# Patient Record
Sex: FEMALE | Race: WHITE | Employment: OTHER | URBAN - NONMETROPOLITAN AREA
[De-identification: names, ages, dates, MRNs, and addresses within clinical notes are randomized per-mention and may not be internally consistent; named-entity substitution may affect disease eponyms.]

---

## 2017-03-29 ENCOUNTER — OFFICE VISIT (OUTPATIENT)
Dept: FAMILY MEDICINE CLINIC | Age: 82
End: 2017-03-29

## 2017-03-29 VITALS
BODY MASS INDEX: 31.54 KG/M2 | WEIGHT: 178 LBS | HEIGHT: 63 IN | SYSTOLIC BLOOD PRESSURE: 138 MMHG | DIASTOLIC BLOOD PRESSURE: 64 MMHG | HEART RATE: 70 BPM | OXYGEN SATURATION: 97 %

## 2017-03-29 DIAGNOSIS — M54.16 LUMBAR RADICULOPATHY: ICD-10-CM

## 2017-03-29 DIAGNOSIS — J44.9 CHRONIC OBSTRUCTIVE PULMONARY DISEASE, UNSPECIFIED COPD TYPE (HCC): ICD-10-CM

## 2017-03-29 DIAGNOSIS — M16.10 PRIMARY OSTEOARTHRITIS OF HIP, UNSPECIFIED LATERALITY: ICD-10-CM

## 2017-03-29 DIAGNOSIS — I10 ESSENTIAL HYPERTENSION: ICD-10-CM

## 2017-03-29 DIAGNOSIS — I25.10 CORONARY ARTERY DISEASE INVOLVING NATIVE HEART WITHOUT ANGINA PECTORIS, UNSPECIFIED VESSEL OR LESION TYPE: ICD-10-CM

## 2017-03-29 DIAGNOSIS — M19.90 ARTHRITIS: Primary | ICD-10-CM

## 2017-03-29 PROCEDURE — G8399 PT W/DXA RESULTS DOCUMENT: HCPCS | Performed by: FAMILY MEDICINE

## 2017-03-29 PROCEDURE — 3023F SPIROM DOC REV: CPT | Performed by: FAMILY MEDICINE

## 2017-03-29 PROCEDURE — 99213 OFFICE O/P EST LOW 20 MIN: CPT | Performed by: FAMILY MEDICINE

## 2017-03-29 PROCEDURE — 1036F TOBACCO NON-USER: CPT | Performed by: FAMILY MEDICINE

## 2017-03-29 PROCEDURE — G8427 DOCREV CUR MEDS BY ELIG CLIN: HCPCS | Performed by: FAMILY MEDICINE

## 2017-03-29 PROCEDURE — 4040F PNEUMOC VAC/ADMIN/RCVD: CPT | Performed by: FAMILY MEDICINE

## 2017-03-29 PROCEDURE — G8484 FLU IMMUNIZE NO ADMIN: HCPCS | Performed by: FAMILY MEDICINE

## 2017-03-29 PROCEDURE — G8598 ASA/ANTIPLAT THER USED: HCPCS | Performed by: FAMILY MEDICINE

## 2017-03-29 PROCEDURE — G8926 SPIRO NO PERF OR DOC: HCPCS | Performed by: FAMILY MEDICINE

## 2017-03-29 PROCEDURE — 1090F PRES/ABSN URINE INCON ASSESS: CPT | Performed by: FAMILY MEDICINE

## 2017-03-29 PROCEDURE — 1123F ACP DISCUSS/DSCN MKR DOCD: CPT | Performed by: FAMILY MEDICINE

## 2017-03-29 PROCEDURE — G8417 CALC BMI ABV UP PARAM F/U: HCPCS | Performed by: FAMILY MEDICINE

## 2017-03-29 RX ORDER — CELECOXIB 200 MG/1
200 CAPSULE ORAL DAILY
Qty: 30 CAPSULE | Refills: 3 | Status: SHIPPED | OUTPATIENT
Start: 2017-03-29 | End: 2017-03-29 | Stop reason: SDUPTHER

## 2017-03-29 RX ORDER — NEBIVOLOL HYDROCHLORIDE 20 MG/1
20 TABLET ORAL DAILY
Status: ON HOLD | COMMUNITY
Start: 2017-03-13 | End: 2018-03-21 | Stop reason: HOSPADM

## 2017-03-29 RX ORDER — VALSARTAN 160 MG/1
160 TABLET ORAL DAILY
Status: ON HOLD | COMMUNITY
End: 2018-03-10

## 2017-03-29 RX ORDER — HYDROCHLOROTHIAZIDE 25 MG/1
25 TABLET ORAL DAILY
COMMUNITY
Start: 2017-02-11 | End: 2018-05-15

## 2017-03-29 RX ORDER — UBIDECARENONE 200 MG
CAPSULE ORAL
COMMUNITY
End: 2018-03-07

## 2017-03-30 RX ORDER — CELECOXIB 200 MG/1
CAPSULE ORAL
Qty: 90 CAPSULE | Refills: 3 | Status: SHIPPED | OUTPATIENT
Start: 2017-03-30 | End: 2018-03-07

## 2017-06-26 DIAGNOSIS — J44.9 CHRONIC OBSTRUCTIVE PULMONARY DISEASE, UNSPECIFIED COPD TYPE (HCC): ICD-10-CM

## 2017-06-26 RX ORDER — MONTELUKAST SODIUM 10 MG/1
TABLET ORAL
Qty: 90 TABLET | Refills: 0 | Status: SHIPPED | OUTPATIENT
Start: 2017-06-26 | End: 2017-09-21 | Stop reason: SDUPTHER

## 2017-06-30 ENCOUNTER — OFFICE VISIT (OUTPATIENT)
Dept: FAMILY MEDICINE CLINIC | Age: 82
End: 2017-06-30

## 2017-06-30 VITALS
HEART RATE: 88 BPM | OXYGEN SATURATION: 97 % | TEMPERATURE: 99.2 F | DIASTOLIC BLOOD PRESSURE: 70 MMHG | SYSTOLIC BLOOD PRESSURE: 138 MMHG | HEIGHT: 63 IN

## 2017-06-30 DIAGNOSIS — M25.50 POLYARTHRALGIA: ICD-10-CM

## 2017-06-30 DIAGNOSIS — J44.9 CHRONIC OBSTRUCTIVE PULMONARY DISEASE, UNSPECIFIED COPD TYPE (HCC): ICD-10-CM

## 2017-06-30 DIAGNOSIS — R11.0 NAUSEA: Primary | ICD-10-CM

## 2017-06-30 DIAGNOSIS — R07.89 ATYPICAL CHEST PAIN: ICD-10-CM

## 2017-06-30 DIAGNOSIS — R53.83 OTHER MALAISE AND FATIGUE: ICD-10-CM

## 2017-06-30 DIAGNOSIS — R06.02 SOB (SHORTNESS OF BREATH): ICD-10-CM

## 2017-06-30 DIAGNOSIS — R53.81 OTHER MALAISE AND FATIGUE: ICD-10-CM

## 2017-06-30 DIAGNOSIS — I25.10 CORONARY ARTERY DISEASE INVOLVING NATIVE HEART WITHOUT ANGINA PECTORIS, UNSPECIFIED VESSEL OR LESION TYPE: ICD-10-CM

## 2017-06-30 PROCEDURE — 1090F PRES/ABSN URINE INCON ASSESS: CPT | Performed by: FAMILY MEDICINE

## 2017-06-30 PROCEDURE — 1036F TOBACCO NON-USER: CPT | Performed by: FAMILY MEDICINE

## 2017-06-30 PROCEDURE — 4040F PNEUMOC VAC/ADMIN/RCVD: CPT | Performed by: FAMILY MEDICINE

## 2017-06-30 PROCEDURE — 1123F ACP DISCUSS/DSCN MKR DOCD: CPT | Performed by: FAMILY MEDICINE

## 2017-06-30 PROCEDURE — 99213 OFFICE O/P EST LOW 20 MIN: CPT | Performed by: FAMILY MEDICINE

## 2017-06-30 PROCEDURE — G8598 ASA/ANTIPLAT THER USED: HCPCS | Performed by: FAMILY MEDICINE

## 2017-06-30 PROCEDURE — G8427 DOCREV CUR MEDS BY ELIG CLIN: HCPCS | Performed by: FAMILY MEDICINE

## 2017-06-30 PROCEDURE — 3023F SPIROM DOC REV: CPT | Performed by: FAMILY MEDICINE

## 2017-06-30 PROCEDURE — G8399 PT W/DXA RESULTS DOCUMENT: HCPCS | Performed by: FAMILY MEDICINE

## 2017-06-30 PROCEDURE — G8926 SPIRO NO PERF OR DOC: HCPCS | Performed by: FAMILY MEDICINE

## 2017-06-30 PROCEDURE — G8417 CALC BMI ABV UP PARAM F/U: HCPCS | Performed by: FAMILY MEDICINE

## 2017-06-30 RX ORDER — ONDANSETRON 4 MG/1
4 TABLET, ORALLY DISINTEGRATING ORAL EVERY 8 HOURS PRN
Qty: 15 TABLET | Refills: 0 | Status: SHIPPED | OUTPATIENT
Start: 2017-06-30 | End: 2018-03-07

## 2017-06-30 RX ORDER — NITROGLYCERIN 0.4 MG/1
0.4 TABLET SUBLINGUAL EVERY 5 MIN PRN
Qty: 25 TABLET | Refills: 0 | Status: SHIPPED | OUTPATIENT
Start: 2017-06-30 | End: 2018-05-15

## 2017-07-01 ENCOUNTER — HOSPITAL ENCOUNTER (OUTPATIENT)
Dept: GENERAL RADIOLOGY | Age: 82
Discharge: HOME | End: 2017-07-01
Payer: MEDICARE

## 2017-07-01 DIAGNOSIS — R06.02 SOB (SHORTNESS OF BREATH): ICD-10-CM

## 2017-07-01 DIAGNOSIS — R53.83 OTHER MALAISE AND FATIGUE: ICD-10-CM

## 2017-07-01 DIAGNOSIS — R53.81 OTHER MALAISE AND FATIGUE: ICD-10-CM

## 2017-07-01 DIAGNOSIS — M25.50 POLYARTHRALGIA: ICD-10-CM

## 2017-07-01 LAB
ALBUMIN SERPL-MCNC: 4.5 G/DL (ref 3.9–4.9)
ALP BLD-CCNC: 41 U/L (ref 40–130)
ALT SERPL-CCNC: 19 U/L (ref 0–33)
ANION GAP SERPL CALCULATED.3IONS-SCNC: 14 MEQ/L (ref 7–13)
AST SERPL-CCNC: 18 U/L (ref 0–35)
BILIRUB SERPL-MCNC: 0.4 MG/DL (ref 0–1.2)
BUN BLDV-MCNC: 18 MG/DL (ref 8–23)
C-REACTIVE PROTEIN: 3.5 MG/L (ref 0–5)
CALCIUM SERPL-MCNC: 9.5 MG/DL (ref 8.6–10.2)
CHLORIDE BLD-SCNC: 99 MEQ/L (ref 98–107)
CO2: 27 MEQ/L (ref 22–29)
CREAT SERPL-MCNC: 1.18 MG/DL (ref 0.5–0.9)
GFR AFRICAN AMERICAN: 53
GFR NON-AFRICAN AMERICAN: 43.8
GLOBULIN: 2.6 G/DL (ref 2.3–3.5)
GLUCOSE BLD-MCNC: 102 MG/DL (ref 74–109)
HCT VFR BLD CALC: 39.5 % (ref 37–47)
HEMOGLOBIN: 13 G/DL (ref 12–16)
MCH RBC QN AUTO: 31.2 PG (ref 27–31.3)
MCHC RBC AUTO-ENTMCNC: 32.8 % (ref 33–37)
MCV RBC AUTO: 94.9 FL (ref 82–100)
PDW BLD-RTO: 13.8 % (ref 11.5–14.5)
PLATELET # BLD: 281 K/UL (ref 130–400)
POTASSIUM SERPL-SCNC: 4 MEQ/L (ref 3.5–5.1)
RBC # BLD: 4.16 M/UL (ref 4.2–5.4)
SEDIMENTATION RATE, ERYTHROCYTE: 28 MM (ref 0–30)
SODIUM BLD-SCNC: 140 MEQ/L (ref 132–144)
TOTAL PROTEIN: 7.1 G/DL (ref 6.4–8.1)
TSH SERPL DL<=0.05 MIU/L-ACNC: 2.05 UIU/ML (ref 0.27–4.2)
WBC # BLD: 9.6 K/UL (ref 4.8–10.8)

## 2017-07-01 PROCEDURE — 71020 XR CHEST STANDARD TWO VW: CPT

## 2017-09-21 DIAGNOSIS — J44.9 CHRONIC OBSTRUCTIVE PULMONARY DISEASE, UNSPECIFIED COPD TYPE (HCC): ICD-10-CM

## 2017-09-21 RX ORDER — MONTELUKAST SODIUM 10 MG/1
TABLET ORAL
Qty: 90 TABLET | Refills: 0 | Status: SHIPPED | OUTPATIENT
Start: 2017-09-21 | End: 2017-12-21 | Stop reason: SDUPTHER

## 2017-10-16 ENCOUNTER — OFFICE VISIT (OUTPATIENT)
Dept: FAMILY MEDICINE CLINIC | Age: 82
End: 2017-10-16

## 2017-10-16 VITALS
OXYGEN SATURATION: 98 % | DIASTOLIC BLOOD PRESSURE: 84 MMHG | WEIGHT: 175.2 LBS | HEART RATE: 75 BPM | BODY MASS INDEX: 31.04 KG/M2 | SYSTOLIC BLOOD PRESSURE: 124 MMHG | HEIGHT: 63 IN

## 2017-10-16 DIAGNOSIS — M25.511 CHRONIC RIGHT SHOULDER PAIN: ICD-10-CM

## 2017-10-16 DIAGNOSIS — M19.90 ARTHRITIS: ICD-10-CM

## 2017-10-16 DIAGNOSIS — G89.29 CHRONIC RIGHT SHOULDER PAIN: ICD-10-CM

## 2017-10-16 DIAGNOSIS — M62.830 BACK SPASM: ICD-10-CM

## 2017-10-16 DIAGNOSIS — M25.511 CHRONIC RIGHT SHOULDER PAIN: Primary | ICD-10-CM

## 2017-10-16 DIAGNOSIS — G89.29 CHRONIC RIGHT SHOULDER PAIN: Primary | ICD-10-CM

## 2017-10-16 PROCEDURE — G8399 PT W/DXA RESULTS DOCUMENT: HCPCS | Performed by: FAMILY MEDICINE

## 2017-10-16 PROCEDURE — 4040F PNEUMOC VAC/ADMIN/RCVD: CPT | Performed by: FAMILY MEDICINE

## 2017-10-16 PROCEDURE — 99213 OFFICE O/P EST LOW 20 MIN: CPT | Performed by: FAMILY MEDICINE

## 2017-10-16 PROCEDURE — G8427 DOCREV CUR MEDS BY ELIG CLIN: HCPCS | Performed by: FAMILY MEDICINE

## 2017-10-16 PROCEDURE — 20610 DRAIN/INJ JOINT/BURSA W/O US: CPT | Performed by: FAMILY MEDICINE

## 2017-10-16 PROCEDURE — 1036F TOBACCO NON-USER: CPT | Performed by: FAMILY MEDICINE

## 2017-10-16 PROCEDURE — 1123F ACP DISCUSS/DSCN MKR DOCD: CPT | Performed by: FAMILY MEDICINE

## 2017-10-16 PROCEDURE — G8598 ASA/ANTIPLAT THER USED: HCPCS | Performed by: FAMILY MEDICINE

## 2017-10-16 PROCEDURE — G8484 FLU IMMUNIZE NO ADMIN: HCPCS | Performed by: FAMILY MEDICINE

## 2017-10-16 PROCEDURE — G8417 CALC BMI ABV UP PARAM F/U: HCPCS | Performed by: FAMILY MEDICINE

## 2017-10-16 PROCEDURE — G8510 SCR DEP NEG, NO PLAN REQD: HCPCS | Performed by: FAMILY MEDICINE

## 2017-10-16 PROCEDURE — 3288F FALL RISK ASSESSMENT DOCD: CPT | Performed by: FAMILY MEDICINE

## 2017-10-16 PROCEDURE — 1090F PRES/ABSN URINE INCON ASSESS: CPT | Performed by: FAMILY MEDICINE

## 2017-10-16 RX ORDER — DICLOFENAC SODIUM 75 MG/1
75 TABLET, DELAYED RELEASE ORAL 2 TIMES DAILY
Qty: 60 TABLET | Refills: 3 | Status: SHIPPED | OUTPATIENT
Start: 2017-10-16 | End: 2017-10-16 | Stop reason: SDUPTHER

## 2017-10-16 RX ORDER — TIZANIDINE 4 MG/1
TABLET ORAL
Qty: 270 TABLET | Refills: 2 | Status: SHIPPED | OUTPATIENT
Start: 2017-10-16 | End: 2018-03-10

## 2017-10-16 RX ORDER — TIZANIDINE 4 MG/1
4 TABLET ORAL EVERY 8 HOURS PRN
Qty: 90 TABLET | Refills: 2 | Status: SHIPPED | OUTPATIENT
Start: 2017-10-16 | End: 2017-10-16 | Stop reason: SDUPTHER

## 2017-10-16 RX ORDER — TRIAMCINOLONE ACETONIDE 40 MG/ML
40 INJECTION, SUSPENSION INTRA-ARTICULAR; INTRAMUSCULAR ONCE
Status: COMPLETED | OUTPATIENT
Start: 2017-10-16 | End: 2017-10-16

## 2017-10-16 RX ORDER — DICLOFENAC SODIUM 75 MG/1
TABLET, DELAYED RELEASE ORAL
Qty: 180 TABLET | Refills: 3 | Status: SHIPPED | OUTPATIENT
Start: 2017-10-16 | End: 2018-03-07

## 2017-10-16 RX ADMIN — TRIAMCINOLONE ACETONIDE 40 MG: 40 INJECTION, SUSPENSION INTRA-ARTICULAR; INTRAMUSCULAR at 15:04

## 2017-10-16 ASSESSMENT — PATIENT HEALTH QUESTIONNAIRE - PHQ9
SUM OF ALL RESPONSES TO PHQ QUESTIONS 1-9: 0
2. FEELING DOWN, DEPRESSED OR HOPELESS: 0
SUM OF ALL RESPONSES TO PHQ9 QUESTIONS 1 & 2: 0
1. LITTLE INTEREST OR PLEASURE IN DOING THINGS: 0

## 2017-10-16 NOTE — PROGRESS NOTES
1 tablet by mouth every 8 hours as needed (back spasm) 90 tablet 2    diclofenac (VOLTAREN) 75 MG EC tablet Take 1 tablet by mouth 2 times daily 60 tablet 3    montelukast (SINGULAIR) 10 MG tablet TAKE 1 TABLET BY MOUTH DAILY 90 tablet 0    nitroGLYCERIN (NITROSTAT) 0.4 MG SL tablet Place 1 tablet under the tongue every 5 minutes as needed for Chest pain 25 tablet 0    ondansetron (ZOFRAN-ODT) 4 MG disintegrating tablet Take 1 tablet by mouth every 8 hours as needed for Nausea or Vomiting 15 tablet 0    simvastatin (ZOCOR) 40 MG tablet TAKE 1 TABLET BY MOUTH EVERY EVENING 90 tablet 2    celecoxib (CELEBREX) 200 MG capsule TAKE 1 CAPSULE BY MOUTH DAILY 90 capsule 3    valsartan (DIOVAN) 160 MG tablet Take 160 mg by mouth daily      aspirin 81 MG tablet Take 81 mg by mouth daily      Multiple Vitamins-Minerals (CENTRUM SILVER ADULT 50+ PO) Take by mouth      Coenzyme Q10 200 MG CAPS Take by mouth      BYSTOLIC 20 MG TABS tablet       hydrochlorothiazide (HYDRODIURIL) 25 MG tablet       Handicap Placard MISC by Does not apply route Expires in 5 years 1 each 0    SYMBICORT 160-4.5 MCG/ACT AERO       OXYGEN Pt needs evaluation for portable oxygen--dx 496 1 each 0    ipratropium-albuterol (DUONEB) 0.5-2.5 (3) MG/3ML SOLN nebulizer solution Inhale 3 mLs into the lungs every 4 hours as needed for Shortness of Breath. 300 vial 3    mometasone (ELOCON) 0.1 % cream Apply topically daily. 1 Tube 1    albuterol (PROVENTIL HFA) 108 (90 BASE) MCG/ACT inhaler Inhale 2 puffs into the lungs every 6 hours as needed for Wheezing.  1 Inhaler 5     Current Facility-Administered Medications   Medication Dose Route Frequency Provider Last Rate Last Dose    triamcinolone acetonide (KENALOG-40) injection 40 mg  40 mg Intramuscular Once Jennifer Mcgowan MD        triamcinolone acetonide (KENALOG-40) injection 40 mg  40 mg Intramuscular Once Jennifer Mcgowan MD         Allergies   Allergen Reactions    Codeine        Review of chloride spray    Procedure Details     After a discussion of the risks and benefits with the patient (including the possibility that any manipulation of the joint space could introduce infection, worsening the current situation significantly), verbal consent was obtained for the procedure. The joint was prepped with alcohol,  Betadine x2, then again with alcohol. Ethyl chloride spray used for topical anesthesia. Using a lateral approach, the shoulder was injected 40mg kenalog and 2cc 2%lidocaine without epi. The injection site was cleansed with topical isopropyl alcohol and a dressing was applied. Complications:  None; patient tolerated the procedure well.      voltaren and zanaflex    ongoing f/u with Dr. Rowena De Los Santos and Dr. Preethi Nassar      Updated health maintenance.       Linda Ybarra MD

## 2017-10-30 ENCOUNTER — OFFICE VISIT (OUTPATIENT)
Dept: PULMONOLOGY | Age: 82
End: 2017-10-30

## 2017-10-30 VITALS
TEMPERATURE: 99 F | WEIGHT: 173 LBS | SYSTOLIC BLOOD PRESSURE: 124 MMHG | BODY MASS INDEX: 30.65 KG/M2 | HEIGHT: 63 IN | OXYGEN SATURATION: 96 % | DIASTOLIC BLOOD PRESSURE: 78 MMHG | HEART RATE: 68 BPM

## 2017-10-30 DIAGNOSIS — R09.02 HYPOXEMIA: ICD-10-CM

## 2017-10-30 DIAGNOSIS — Z87.891 HISTORY OF TOBACCO ABUSE: ICD-10-CM

## 2017-10-30 DIAGNOSIS — J44.9 CHRONIC OBSTRUCTIVE PULMONARY DISEASE, UNSPECIFIED COPD TYPE (HCC): Primary | ICD-10-CM

## 2017-10-30 DIAGNOSIS — R06.02 SOB (SHORTNESS OF BREATH) ON EXERTION: ICD-10-CM

## 2017-10-30 PROCEDURE — 1123F ACP DISCUSS/DSCN MKR DOCD: CPT | Performed by: PHYSICIAN ASSISTANT

## 2017-10-30 PROCEDURE — 4040F PNEUMOC VAC/ADMIN/RCVD: CPT | Performed by: PHYSICIAN ASSISTANT

## 2017-10-30 PROCEDURE — G8417 CALC BMI ABV UP PARAM F/U: HCPCS | Performed by: PHYSICIAN ASSISTANT

## 2017-10-30 PROCEDURE — 1036F TOBACCO NON-USER: CPT | Performed by: PHYSICIAN ASSISTANT

## 2017-10-30 PROCEDURE — G8427 DOCREV CUR MEDS BY ELIG CLIN: HCPCS | Performed by: PHYSICIAN ASSISTANT

## 2017-10-30 PROCEDURE — 1090F PRES/ABSN URINE INCON ASSESS: CPT | Performed by: PHYSICIAN ASSISTANT

## 2017-10-30 PROCEDURE — G8926 SPIRO NO PERF OR DOC: HCPCS | Performed by: PHYSICIAN ASSISTANT

## 2017-10-30 PROCEDURE — 3023F SPIROM DOC REV: CPT | Performed by: PHYSICIAN ASSISTANT

## 2017-10-30 PROCEDURE — G8598 ASA/ANTIPLAT THER USED: HCPCS | Performed by: PHYSICIAN ASSISTANT

## 2017-10-30 PROCEDURE — 99213 OFFICE O/P EST LOW 20 MIN: CPT | Performed by: PHYSICIAN ASSISTANT

## 2017-10-30 PROCEDURE — G8399 PT W/DXA RESULTS DOCUMENT: HCPCS | Performed by: PHYSICIAN ASSISTANT

## 2017-10-30 PROCEDURE — G8484 FLU IMMUNIZE NO ADMIN: HCPCS | Performed by: PHYSICIAN ASSISTANT

## 2017-10-30 ASSESSMENT — ENCOUNTER SYMPTOMS
COUGH: 1
SORE THROAT: 0
ABDOMINAL PAIN: 0
WHEEZING: 1
SINUS PAIN: 0
BACK PAIN: 0
SINUS PRESSURE: 0
SHORTNESS OF BREATH: 1
STRIDOR: 0
RHINORRHEA: 0

## 2017-10-30 NOTE — PROGRESS NOTES
pain.   Endocrine: Negative for polyuria. Genitourinary: Negative for dysuria. Musculoskeletal: Negative for back pain. Neurological: Negative for dizziness, facial asymmetry and headaches. Psychiatric/Behavioral: Negative for agitation and confusion. Objective    Vitals:    10/30/17 1328   BP: 124/78   Site: Right Arm   Position: Sitting   Cuff Size: Medium Adult   Pulse: 68   Temp: 99 °F (37.2 °C)   TempSrc: Tympanic   SpO2: 96%   Weight: 173 lb (78.5 kg)   Height: 5' 3\" (1.6 m)       Physical Exam   Constitutional: She is oriented to person, place, and time. She appears well-developed and well-nourished. No distress. HENT:   Head: Normocephalic and atraumatic. Right Ear: External ear normal.   Left Ear: External ear normal.   Mouth/Throat: No oropharyngeal exudate. Eyes: Pupils are equal, round, and reactive to light. Neck: Normal range of motion. Cardiovascular: Normal rate and regular rhythm. Exam reveals no gallop and no friction rub. No murmur heard. Pulmonary/Chest: Effort normal and breath sounds normal. No respiratory distress. She has no wheezes. She has no rales. She exhibits no tenderness. Diminished breath sounds bilaterally but otherwise clear to ausculation   Abdominal: Soft. There is no tenderness. Musculoskeletal: Normal range of motion. She exhibits no edema. Lymphadenopathy:     She has no cervical adenopathy. Neurological: She is alert and oriented to person, place, and time. No cranial nerve deficit. Skin: Skin is warm. She is not diaphoretic. No erythema. Assessment and Plan      ICD-10-CM ICD-9-CM    1. Chronic obstructive pulmonary disease, unspecified COPD type ([facility] J44.9 496    2. History of tobacco abuse Z87.891 V15.82    3. Hypoxemia R09.02 799.02    4. SOB (shortness of breath) on exertion R06.02 786.05      Patient reports some chronic exertional SOB that remains the same as before.  She is not interested in any additional testing at this time and is aware that if symptoms were ever unusual or atypical she she seek ER evaluation. We will continue prior treatment with symbicort 160/4.5 2 puffs BID, and duoneb QID. Patient does not need any refills at this time. She also has O2 with sleep and exercise and will continue this. Patient is to notify us of any changes in her respiratory status otherwise we will see her for routine follow up in 4 months as before. No red flags found on exam today. Discussed with patient red flag symptoms and advised ER evaluation if symptoms were to worsen. Reviewed with the patient: current clinical status, medications, activities and diet. Side effects, adverse effects of the medication prescribed today, as well as treatment plan and result expectations have been discussed with the patient who expresses understanding and desires to proceed. Close follow up to evaluate treatment results and for coordination of care. I have reviewed the patient's medical history in detail and updated the computerized patient record. Return in about 4 months (around 2/28/2018) for re-evaluation.     Mikael Lovell PA-C

## 2017-12-21 DIAGNOSIS — J44.9 CHRONIC OBSTRUCTIVE PULMONARY DISEASE, UNSPECIFIED COPD TYPE (HCC): ICD-10-CM

## 2017-12-21 RX ORDER — MONTELUKAST SODIUM 10 MG/1
TABLET ORAL
Qty: 90 TABLET | Refills: 0 | Status: SHIPPED | OUTPATIENT
Start: 2017-12-21 | End: 2018-05-15

## 2018-01-18 RX ORDER — SIMVASTATIN 40 MG
TABLET ORAL
Qty: 90 TABLET | Refills: 0 | Status: ON HOLD | OUTPATIENT
Start: 2018-01-18 | End: 2018-03-21 | Stop reason: HOSPADM

## 2018-03-07 ENCOUNTER — OFFICE VISIT (OUTPATIENT)
Dept: PULMONOLOGY | Age: 83
End: 2018-03-07
Payer: MEDICARE

## 2018-03-07 ENCOUNTER — APPOINTMENT (OUTPATIENT)
Dept: GENERAL RADIOLOGY | Age: 83
DRG: 190 | End: 2018-03-07
Payer: MEDICARE

## 2018-03-07 ENCOUNTER — HOSPITAL ENCOUNTER (INPATIENT)
Age: 83
LOS: 14 days | Discharge: NURSING, REHAB OR LONG-TERM CARE | DRG: 190 | End: 2018-03-21
Attending: INTERNAL MEDICINE | Admitting: INTERNAL MEDICINE
Payer: MEDICARE

## 2018-03-07 VITALS
TEMPERATURE: 98.9 F | DIASTOLIC BLOOD PRESSURE: 76 MMHG | HEIGHT: 63 IN | OXYGEN SATURATION: 96 % | WEIGHT: 175 LBS | SYSTOLIC BLOOD PRESSURE: 130 MMHG | BODY MASS INDEX: 31.01 KG/M2 | HEART RATE: 81 BPM

## 2018-03-07 DIAGNOSIS — J44.1 COPD EXACERBATION (HCC): Primary | ICD-10-CM

## 2018-03-07 DIAGNOSIS — Z99.81 ON HOME OXYGEN THERAPY: ICD-10-CM

## 2018-03-07 DIAGNOSIS — R06.03 RESPIRATORY DISTRESS: ICD-10-CM

## 2018-03-07 LAB
ALBUMIN SERPL-MCNC: 4.5 G/DL (ref 3.9–4.9)
ALP BLD-CCNC: 55 U/L (ref 40–130)
ALT SERPL-CCNC: 29 U/L (ref 0–33)
ANION GAP SERPL CALCULATED.3IONS-SCNC: 14 MEQ/L (ref 7–13)
AST SERPL-CCNC: 25 U/L (ref 0–35)
BASOPHILS ABSOLUTE: 0.1 K/UL (ref 0–0.2)
BASOPHILS RELATIVE PERCENT: 1.2 %
BILIRUB SERPL-MCNC: 0.3 MG/DL (ref 0–1.2)
BUN BLDV-MCNC: 29 MG/DL (ref 8–23)
CALCIUM SERPL-MCNC: 9.3 MG/DL (ref 8.6–10.2)
CHLORIDE BLD-SCNC: 94 MEQ/L (ref 98–107)
CO2: 26 MEQ/L (ref 22–29)
CREAT SERPL-MCNC: 1.5 MG/DL (ref 0.5–0.9)
EOSINOPHILS ABSOLUTE: 0.4 K/UL (ref 0–0.7)
EOSINOPHILS RELATIVE PERCENT: 5.2 %
GFR AFRICAN AMERICAN: 40.1
GFR NON-AFRICAN AMERICAN: 33.1
GLOBULIN: 2.4 G/DL (ref 2.3–3.5)
GLUCOSE BLD-MCNC: 80 MG/DL (ref 74–109)
HCT VFR BLD CALC: 38.1 % (ref 37–47)
HEMOGLOBIN: 13 G/DL (ref 12–16)
LACTIC ACID: 1.4 MMOL/L (ref 0.5–2.2)
LACTIC ACID: 2.6 MMOL/L (ref 0.5–2.2)
LYMPHOCYTES ABSOLUTE: 1.6 K/UL (ref 1–4.8)
LYMPHOCYTES RELATIVE PERCENT: 19.1 %
MCH RBC QN AUTO: 31.7 PG (ref 27–31.3)
MCHC RBC AUTO-ENTMCNC: 34.1 % (ref 33–37)
MCV RBC AUTO: 93 FL (ref 82–100)
MONOCYTES ABSOLUTE: 1.1 K/UL (ref 0.2–0.8)
MONOCYTES RELATIVE PERCENT: 13.2 %
NEUTROPHILS ABSOLUTE: 5 K/UL (ref 1.4–6.5)
NEUTROPHILS RELATIVE PERCENT: 61.3 %
PDW BLD-RTO: 13.4 % (ref 11.5–14.5)
PLATELET # BLD: 264 K/UL (ref 130–400)
POTASSIUM SERPL-SCNC: 4.5 MEQ/L (ref 3.5–5.1)
RAPID INFLUENZA  B AGN: NEGATIVE
RAPID INFLUENZA A AGN: NEGATIVE
RBC # BLD: 4.09 M/UL (ref 4.2–5.4)
SODIUM BLD-SCNC: 134 MEQ/L (ref 132–144)
TOTAL CK: 196 U/L (ref 0–170)
TOTAL PROTEIN: 6.9 G/DL (ref 6.4–8.1)
TROPONIN: <0.01 NG/ML (ref 0–0.01)
WBC # BLD: 8.1 K/UL (ref 4.8–10.8)

## 2018-03-07 PROCEDURE — 1090F PRES/ABSN URINE INCON ASSESS: CPT | Performed by: INTERNAL MEDICINE

## 2018-03-07 PROCEDURE — 87040 BLOOD CULTURE FOR BACTERIA: CPT

## 2018-03-07 PROCEDURE — 86403 PARTICLE AGGLUT ANTBDY SCRN: CPT

## 2018-03-07 PROCEDURE — 1036F TOBACCO NON-USER: CPT | Performed by: INTERNAL MEDICINE

## 2018-03-07 PROCEDURE — 94640 AIRWAY INHALATION TREATMENT: CPT

## 2018-03-07 PROCEDURE — 96375 TX/PRO/DX INJ NEW DRUG ADDON: CPT

## 2018-03-07 PROCEDURE — 3023F SPIROM DOC REV: CPT | Performed by: INTERNAL MEDICINE

## 2018-03-07 PROCEDURE — G8417 CALC BMI ABV UP PARAM F/U: HCPCS | Performed by: INTERNAL MEDICINE

## 2018-03-07 PROCEDURE — 99214 OFFICE O/P EST MOD 30 MIN: CPT | Performed by: INTERNAL MEDICINE

## 2018-03-07 PROCEDURE — 82553 CREATINE MB FRACTION: CPT

## 2018-03-07 PROCEDURE — 99285 EMERGENCY DEPT VISIT HI MDM: CPT

## 2018-03-07 PROCEDURE — 93005 ELECTROCARDIOGRAM TRACING: CPT

## 2018-03-07 PROCEDURE — 1210000000 HC MED SURG R&B

## 2018-03-07 PROCEDURE — 85025 COMPLETE CBC W/AUTO DIFF WBC: CPT

## 2018-03-07 PROCEDURE — 36415 COLL VENOUS BLD VENIPUNCTURE: CPT

## 2018-03-07 PROCEDURE — 80053 COMPREHEN METABOLIC PANEL: CPT

## 2018-03-07 PROCEDURE — G8926 SPIRO NO PERF OR DOC: HCPCS | Performed by: INTERNAL MEDICINE

## 2018-03-07 PROCEDURE — 1123F ACP DISCUSS/DSCN MKR DOCD: CPT | Performed by: INTERNAL MEDICINE

## 2018-03-07 PROCEDURE — 96365 THER/PROPH/DIAG IV INF INIT: CPT

## 2018-03-07 PROCEDURE — 94660 CPAP INITIATION&MGMT: CPT

## 2018-03-07 PROCEDURE — G8399 PT W/DXA RESULTS DOCUMENT: HCPCS | Performed by: INTERNAL MEDICINE

## 2018-03-07 PROCEDURE — 4040F PNEUMOC VAC/ADMIN/RCVD: CPT | Performed by: INTERNAL MEDICINE

## 2018-03-07 PROCEDURE — G8427 DOCREV CUR MEDS BY ELIG CLIN: HCPCS | Performed by: INTERNAL MEDICINE

## 2018-03-07 PROCEDURE — 83605 ASSAY OF LACTIC ACID: CPT

## 2018-03-07 PROCEDURE — 71045 X-RAY EXAM CHEST 1 VIEW: CPT

## 2018-03-07 PROCEDURE — G8484 FLU IMMUNIZE NO ADMIN: HCPCS | Performed by: INTERNAL MEDICINE

## 2018-03-07 PROCEDURE — G8598 ASA/ANTIPLAT THER USED: HCPCS | Performed by: INTERNAL MEDICINE

## 2018-03-07 PROCEDURE — 84484 ASSAY OF TROPONIN QUANT: CPT

## 2018-03-07 PROCEDURE — 6370000000 HC RX 637 (ALT 250 FOR IP): Performed by: NURSE PRACTITIONER

## 2018-03-07 PROCEDURE — 82550 ASSAY OF CK (CPK): CPT

## 2018-03-07 PROCEDURE — 6360000002 HC RX W HCPCS: Performed by: NURSE PRACTITIONER

## 2018-03-07 RX ORDER — MONTELUKAST SODIUM 10 MG/1
10 TABLET ORAL DAILY
Status: DISCONTINUED | OUTPATIENT
Start: 2018-03-08 | End: 2018-03-21 | Stop reason: HOSPADM

## 2018-03-07 RX ORDER — VALSARTAN 160 MG/1
160 TABLET ORAL DAILY
Status: DISCONTINUED | OUTPATIENT
Start: 2018-03-08 | End: 2018-03-21 | Stop reason: HOSPADM

## 2018-03-07 RX ORDER — LORAZEPAM 2 MG/ML
0.5 INJECTION INTRAMUSCULAR ONCE
Status: DISCONTINUED | OUTPATIENT
Start: 2018-03-07 | End: 2018-03-07 | Stop reason: HOSPADM

## 2018-03-07 RX ORDER — HYDROCHLOROTHIAZIDE 25 MG/1
25 TABLET ORAL DAILY
Status: DISCONTINUED | OUTPATIENT
Start: 2018-03-08 | End: 2018-03-17

## 2018-03-07 RX ORDER — ONDANSETRON 2 MG/ML
4 INJECTION INTRAMUSCULAR; INTRAVENOUS EVERY 6 HOURS PRN
Status: DISCONTINUED | OUTPATIENT
Start: 2018-03-07 | End: 2018-03-21 | Stop reason: HOSPADM

## 2018-03-07 RX ORDER — IPRATROPIUM BROMIDE AND ALBUTEROL SULFATE 2.5; .5 MG/3ML; MG/3ML
1 SOLUTION RESPIRATORY (INHALATION)
Status: DISCONTINUED | OUTPATIENT
Start: 2018-03-08 | End: 2018-03-08

## 2018-03-07 RX ORDER — IPRATROPIUM BROMIDE AND ALBUTEROL SULFATE 2.5; .5 MG/3ML; MG/3ML
1 SOLUTION RESPIRATORY (INHALATION) ONCE
Status: COMPLETED | OUTPATIENT
Start: 2018-03-07 | End: 2018-03-07

## 2018-03-07 RX ORDER — METHYLPREDNISOLONE SODIUM SUCCINATE 125 MG/2ML
125 INJECTION, POWDER, LYOPHILIZED, FOR SOLUTION INTRAMUSCULAR; INTRAVENOUS ONCE
Status: COMPLETED | OUTPATIENT
Start: 2018-03-07 | End: 2018-03-07

## 2018-03-07 RX ORDER — HYDRALAZINE HYDROCHLORIDE 20 MG/ML
10 INJECTION INTRAMUSCULAR; INTRAVENOUS ONCE
Status: COMPLETED | OUTPATIENT
Start: 2018-03-07 | End: 2018-03-07

## 2018-03-07 RX ORDER — ASPIRIN 81 MG/1
81 TABLET, CHEWABLE ORAL DAILY
Status: DISCONTINUED | OUTPATIENT
Start: 2018-03-08 | End: 2018-03-21 | Stop reason: HOSPADM

## 2018-03-07 RX ORDER — SODIUM CHLORIDE 0.9 % (FLUSH) 0.9 %
10 SYRINGE (ML) INJECTION PRN
Status: DISCONTINUED | OUTPATIENT
Start: 2018-03-07 | End: 2018-03-21 | Stop reason: HOSPADM

## 2018-03-07 RX ORDER — SODIUM CHLORIDE 0.9 % (FLUSH) 0.9 %
10 SYRINGE (ML) INJECTION EVERY 12 HOURS SCHEDULED
Status: DISCONTINUED | OUTPATIENT
Start: 2018-03-07 | End: 2018-03-21 | Stop reason: HOSPADM

## 2018-03-07 RX ORDER — ACETAMINOPHEN 325 MG/1
650 TABLET ORAL EVERY 4 HOURS PRN
Status: DISCONTINUED | OUTPATIENT
Start: 2018-03-07 | End: 2018-03-21 | Stop reason: HOSPADM

## 2018-03-07 RX ORDER — ALBUTEROL SULFATE 2.5 MG/3ML
2.5 SOLUTION RESPIRATORY (INHALATION)
Status: DISCONTINUED | OUTPATIENT
Start: 2018-03-08 | End: 2018-03-08

## 2018-03-07 RX ORDER — SODIUM CHLORIDE 9 MG/ML
INJECTION, SOLUTION INTRAVENOUS ONCE
Status: COMPLETED | OUTPATIENT
Start: 2018-03-07 | End: 2018-03-08

## 2018-03-07 RX ORDER — NEBIVOLOL 5 MG/1
20 TABLET ORAL DAILY
Status: DISCONTINUED | OUTPATIENT
Start: 2018-03-08 | End: 2018-03-09

## 2018-03-07 RX ORDER — METHYLPREDNISOLONE SODIUM SUCCINATE 40 MG/ML
40 INJECTION, POWDER, LYOPHILIZED, FOR SOLUTION INTRAMUSCULAR; INTRAVENOUS EVERY 8 HOURS
Status: DISCONTINUED | OUTPATIENT
Start: 2018-03-07 | End: 2018-03-21 | Stop reason: HOSPADM

## 2018-03-07 RX ORDER — MAGNESIUM SULFATE IN WATER 40 MG/ML
4 INJECTION, SOLUTION INTRAVENOUS ONCE
Status: COMPLETED | OUTPATIENT
Start: 2018-03-07 | End: 2018-03-08

## 2018-03-07 RX ORDER — SIMVASTATIN 40 MG
40 TABLET ORAL NIGHTLY
Status: DISCONTINUED | OUTPATIENT
Start: 2018-03-07 | End: 2018-03-09

## 2018-03-07 RX ADMIN — HYDRALAZINE HYDROCHLORIDE 10 MG: 20 INJECTION INTRAMUSCULAR; INTRAVENOUS at 15:49

## 2018-03-07 RX ADMIN — IPRATROPIUM BROMIDE AND ALBUTEROL SULFATE 1 AMPULE: 2.5; .5 SOLUTION RESPIRATORY (INHALATION) at 14:46

## 2018-03-07 RX ADMIN — MAGNESIUM SULFATE HEPTAHYDRATE 4 G: 40 INJECTION, SOLUTION INTRAVENOUS at 15:22

## 2018-03-07 RX ADMIN — METHYLPREDNISOLONE SODIUM SUCCINATE 125 MG: 125 INJECTION, POWDER, FOR SOLUTION INTRAMUSCULAR; INTRAVENOUS at 15:21

## 2018-03-07 ASSESSMENT — ENCOUNTER SYMPTOMS
SORE THROAT: 0
SHORTNESS OF BREATH: 1
NAUSEA: 0
CHEST TIGHTNESS: 0
EYE ITCHING: 0
WHEEZING: 1
VOMITING: 0
SHORTNESS OF BREATH: 1
DIARRHEA: 0
TROUBLE SWALLOWING: 0
CHEST TIGHTNESS: 1
ABDOMINAL PAIN: 0
COUGH: 1
EYE DISCHARGE: 0
WHEEZING: 1
VOICE CHANGE: 0
SINUS PRESSURE: 0
RHINORRHEA: 0

## 2018-03-07 ASSESSMENT — PAIN DESCRIPTION - PAIN TYPE: TYPE: ACUTE PAIN

## 2018-03-07 ASSESSMENT — PAIN DESCRIPTION - LOCATION: LOCATION: CHEST

## 2018-03-07 ASSESSMENT — PAIN SCALES - GENERAL
PAINLEVEL_OUTOF10: 6
PAINLEVEL_OUTOF10: 0

## 2018-03-07 ASSESSMENT — PAIN DESCRIPTION - DESCRIPTORS: DESCRIPTORS: PRESSURE

## 2018-03-07 ASSESSMENT — PAIN DESCRIPTION - ORIENTATION: ORIENTATION: UPPER

## 2018-03-07 NOTE — PROGRESS NOTES
Coordination normal.   Skin: Skin is warm and dry. She is not diaphoretic. Psychiatric: She has a normal mood and affect. Current Outpatient Prescriptions   Medication Sig Dispense Refill    simvastatin (ZOCOR) 40 MG tablet TAKE 1 TABLET BY MOUTH EVERY EVENING 90 tablet 0    montelukast (SINGULAIR) 10 MG tablet TAKE 1 TABLET BY MOUTH DAILY 90 tablet 0    tiZANidine (ZANAFLEX) 4 MG tablet TAKE 1 TABLET BY MOUTH EVERY 8 HOURS AS NEEDED FOR BACK SPASM 270 tablet 2    diclofenac (VOLTAREN) 75 MG EC tablet TAKE 1 TABLET BY MOUTH TWICE DAILY 180 tablet 3    nitroGLYCERIN (NITROSTAT) 0.4 MG SL tablet Place 1 tablet under the tongue every 5 minutes as needed for Chest pain 25 tablet 0    ondansetron (ZOFRAN-ODT) 4 MG disintegrating tablet Take 1 tablet by mouth every 8 hours as needed for Nausea or Vomiting 15 tablet 0    celecoxib (CELEBREX) 200 MG capsule TAKE 1 CAPSULE BY MOUTH DAILY 90 capsule 3    valsartan (DIOVAN) 160 MG tablet Take 160 mg by mouth daily      aspirin 81 MG tablet Take 81 mg by mouth daily      Multiple Vitamins-Minerals (CENTRUM SILVER ADULT 50+ PO) Take by mouth      Coenzyme Q10 200 MG CAPS Take by mouth      BYSTOLIC 20 MG TABS tablet       hydrochlorothiazide (HYDRODIURIL) 25 MG tablet       Handicap Placard MISC by Does not apply route Expires in 5 years 1 each 0    SYMBICORT 160-4.5 MCG/ACT AERO       OXYGEN Pt needs evaluation for portable oxygen--dx 496 1 each 0    ipratropium-albuterol (DUONEB) 0.5-2.5 (3) MG/3ML SOLN nebulizer solution Inhale 3 mLs into the lungs every 4 hours as needed for Shortness of Breath. 300 vial 3    mometasone (ELOCON) 0.1 % cream Apply topically daily. 1 Tube 1    albuterol (PROVENTIL HFA) 108 (90 BASE) MCG/ACT inhaler Inhale 2 puffs into the lungs every 6 hours as needed for Wheezing.  1 Inhaler 5     Current Facility-Administered Medications   Medication Dose Route Frequency Provider Last Rate Last Dose    triamcinolone acetonide

## 2018-03-07 NOTE — PROGRESS NOTES
PHARMACY MEDICATION RECONCILIATION ON ADMISSION   Reviewed medications with patient at bedside and updated patient's home medication list in [facility]   Patient stated that she was given a new medication for her arthritis from Dr. Susan Hand, however was unable to recall what it was. I attempted calling [facility] to verify the medication but they did not have anything recent in their records    Thank you,  Kimberley Boston.  Margarito Abrams, PharmD  PGY-1 Pharmacy Resident  3/7/2018 4:53 PM

## 2018-03-07 NOTE — ED TRIAGE NOTES
A &o x4 elderly female, skin pk/w/d resp labored at rest, ls dim w/ exp wheeze, + harsh cough, further exam def.

## 2018-03-07 NOTE — ED NOTES
"     [facility] History & Physical    Subjective:      Patient ID:   Negrita Castro  55 y.o.   Martínez Renteria R. Leblanc      Chief Complaint:   Breast cancer follow up    HPI:  55 y.o. female with diagnosis of Stage 1 R breast cancer 10/26/15.  "Triple negative".  Adjuvant AC x's 4, tx's 12 and Rad Rx through 16.    Port removed.  Had bilateral reconstruction surgery 17.  Further reconstruction, tummy tuck 17.  Additional fat injection at R chest done for symmetry.      DM, cholesterol, epilepsy hx, DJD, LBP.  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    LR shoulder arthroscopy, R wrist surgery, M0.    Smoke  ppd x's 35 years, quit 2015.  ETOH no.  Disability-depression, epilepsy  Allergy none    Mom ovarian cancer  Dad lung cancer  Sisters DM, lung dx.      ROS:   GEN: normal without any fever, night sweats or weight loss  HEENT: normal with no HA's, sore throat, stiff neck, changes in vision  CV: normal with no CP, SOB, PND, POOL or orthopnea  PULM: normal with no SOB, cough, hemoptysis, sputum or pleuritic pain  GI: normal with no abdominal pain, nausea, vomiting, constipation, diarrhea, melanotic stools, BRBPR, or hematemesis  : normal with no hematuria, dysuria  BREAST: See HPI.  SKIN: normal with no rash, erythema, bruising, or swelling     Past Medical History:   Diagnosis Date    Breast cancer     Cancer     RIGHT BREAST 10-15    Depression     Diabetes mellitus     Neuropathy     Panic attacks     S/P epidural steroid injection     Seizures     none since early     Wears glasses     TO DRIVE     Past Surgical History:   Procedure Laterality Date    BREAST BIOPSY      right    breast reconstruction with tumsofy lisa      BREAST SURGERY      11-3-15 LUMPECTOMY, 12-2-15 REEXCISION    mastectomy 2016      shoulder surgery and wrist      BILAT  BONE SPUR    WRIST SURGERY      RIGHT       Review of patient's allergies indicates:   Allergen Reactions    Adhesive tape-silicones " Pt reports left hand tingling . Neg neuro deficits noted. PA [place] advsied and called to bedside. PA at bedside. Pt appears anxious, denies new or worsening SOB or chest pressure. PA request this nurse to remove BiPAP and place pt on NC at 4 LPM and observe pt.       Magui Lawrence RN  03/07/18 8712 Hives     BANDAIDS    Polymycin Hives    Latex, natural rubber Itching    Polyurethane-39      Social History     Social History    Marital status: Single     Spouse name: N/A    Number of children: N/A    Years of education: N/A     Occupational History    Not on file.     Social History Main Topics    Smoking status: Former Smoker     Packs/day: 0.25     Years: 30.00     Quit date: 9/28/2015    Smokeless tobacco: Never Used    Alcohol use 0.0 oz/week      Comment: RARELY    Drug use: Unknown      Comment: medical marijuana    Sexual activity: Not on file     Other Topics Concern    Not on file     Social History Narrative    No narrative on file         Current Outpatient Prescriptions:     ASCORBIC ACID/VITAMIN E/BIOTIN (HAIR, SKIN, NAILS WITH BIOTIN ORAL), Take 1 tablet by mouth every morning. , Disp: , Rfl:     carisoprodol (SOMA) 350 MG tablet, Take 1 tablet (350 mg total) by mouth 3 (three) times daily as needed., Disp: 90 tablet, Rfl: 0    clindamycin (CLEOCIN) 300 MG capsule, Take 1 capsule (300 mg total) by mouth every 6 (six) hours., Disp: 30 capsule, Rfl: 0    diazepam (VALIUM) 10 MG tablet, Take 10 mg by mouth 4 (four) times daily. , Disp: , Rfl:     dronabinol (MARINOL) 2.5 MG capsule, Take 1 capsule (2.5 mg total) by mouth 2 (two) times daily before meals., Disp: 30 capsule, Rfl: 1    enalapril (VASOTEC) 2.5 MG tablet, Take 2.5 mg by mouth once daily. For diabetes, Disp: , Rfl: 1    furosemide (LASIX) 20 MG tablet, Take 20 mg by mouth once daily. , Disp: , Rfl:     glimepiride (AMARYL) 4 MG tablet, , Disp: , Rfl:     INSULIN GLARGINE,HUM.REC.ANLOG (BASAGLAR KWIKPEN SUBQ), Inject 10 Units into the skin., Disp: , Rfl:     metformin (GLUCOPHAGE-XR) 500 MG 24 hr tablet, , Disp: , Rfl:     multivitamin capsule, Take 1 capsule by mouth every morning., Disp: , Rfl:     oxyCODONE-acetaminophen (PERCOCET)  mg per tablet, Take 1 tablet by mouth every 4 (four) hours as needed for  "Pain., Disp: 28 tablet, Rfl: 0    pravastatin (PRAVACHOL) 40 MG tablet, Take 40 mg by mouth every morning. , Disp: , Rfl: 1    zolpidem (AMBIEN) 10 mg Tab, Take 10 mg by mouth nightly as needed., Disp: , Rfl: 0          Objective:   Vitals:  Blood pressure 139/83, pulse 108, temperature 97.9 °F (36.6 °C), resp. rate 18, height 5' 11" (1.803 m), weight 93.9 kg (207 lb), last menstrual period 01/16/2016.    Physical Examination:   GEN: no apparent distress, comfortable  HEAD: atraumatic and normocephalic  EYES: no pallor, no icterus  ENT: no pharyngeal erythema, external ears WNL; no nasal discharge; no thrush  NECK: no masses, thyroid normal, trachea midline, no LAD/LN's, supple  CV: RRR with no murmur; normal pulse  CHEST: Normal respiratory effort; CTAB; normal breath sounds; no wheeze or crackles  ABDOM:  nondistended; soft; normal bowel sounds; no rebound/guarding, abdomenal wall healing, liver spleen not palpated  MUSC/Skeletal: ROM normal; no crepitus; joints normal  EXTREM: no clubbing, cyanosis, inflammation or swelling  SKIN: She is developing keloid changes at L breast incision and navel surgical sites.  : no devi  NEURO: grossly intact; motor/sensory WNL; no tremors  PSYCH: normal mood, affect and behavior  LYMPH: normal cervical, supraclavicular, axillary and groin LN's  BREASTS: L and "R breast" healing, extensive post op change, at chest .  No palpable mass.      Labs:   Lab Results   Component Value Date    WBC 4.90 02/07/2018    HGB 13.8 02/07/2018    HCT 40.9 02/07/2018    MCV 89 02/07/2018     02/07/2018    CMP    Bone Scan negative for metastatic dx. 2/7/18.  CAT of chest negative for metastatic dx. 2/7/18.  Assessment:   (1) 55 y.o. female with diagnosis of Stage 1 triple negative R breast cancer, 10/2016.       S/P R mastectomy, SNBx, AC x's 4, T x's12 weeks, Rad Rx and recent reconstruction.    (2)Keloids developing, Dr. Hernandez injecting scar areas.    (3)Refill marinol, " norco.    RTC 4 months.

## 2018-03-07 NOTE — ED PROVIDER NOTES
however we'll hold off to see how the patient tolerates the BiPAP. She will need frequent re-evaluations. After these multiple repeated evaluations the patient has tolerated the BiPAP well and is no longer in a respiratory distress. Patient does request to have the BiPAP off however I have urged her to keep the BiPAP on until we can obtain a blood gas 1 hour after initiation to ensure respiratory status continues to improve. She verbalizes understanding and is agreeable to this. She does not feel she needs any narcotics or any anxiolytics at this time for further toleration of the BiPAP. The patient did have significant anxiety/panic attack while on the BiPAP. The patient did show enough improvement where he felt that this could come off. She was placed on nasal cannula O2. She has continued to tolerate this well. Her workup was largely unremarkable here in the emergency department. However given the patient's significant respiratory distress on arrival I do not feel comfortable sending her home. The patient is agreeable to plan that includes admission to the hospital for further evaluation and management. She will be admitted to the medical floor showing no signs or symptoms of distress or decompensation and is in an overall improved status. I do not anticipate any respiratory decline. CRITICAL CARE TIME     Critical Care: The high probability of sudden, clinically significant deterioration in the patient's condition required the highest level of my preparedness to intervene urgently. The services I provided to this patient were to treat and/or prevent clinically significant deterioration.  Services included the following: chart data review, reviewing nursing notes and/or old charts, documentation time, consultant collaboration regarding findings and treatment options, medication orders and management, direct patient care, vital sign assessments and ordering, interpreting and reviewing diagnostic studies/lab tests. Aggregate critical care time includes only time during which I was engaged in work directly related to the patient's care, as described above, whether at the bedside or elsewhere in the Emergency Department. It did not include time spent performing other reported procedures. Critical Care: 63 minutes. CONSULTS:  IP CONSULT TO HOSPITALIST  IP CONSULT TO PULMONOLOGY    PROCEDURES:  Unless otherwise noted below, none     Procedures    FINAL IMPRESSION      1. COPD exacerbation ([facility]    2.  Respiratory distress          DISPOSITION/PLAN   DISPOSITION Admitted 03/07/2018 04:46:47 PM      PATIENT REFERRED TO:  Donavon Lange MD  [address] [place] [address]  [phone]            DISCHARGE MEDICATIONS:  New Prescriptions    No medications on file          (Please note that portions of this note were completed with a voice recognition program.  Efforts were made to edit the dictations but occasionally words are mis-transcribed.)    Ami Israel CNP (electronically signed)  Attending Emergency Physician         Ami Israel, [facility]  03/07/18 5079

## 2018-03-08 LAB
ANION GAP SERPL CALCULATED.3IONS-SCNC: 19 MEQ/L (ref 7–13)
BASOPHILS ABSOLUTE: 0 K/UL (ref 0–0.2)
BASOPHILS RELATIVE PERCENT: 0.3 %
BUN BLDV-MCNC: 28 MG/DL (ref 8–23)
CALCIUM SERPL-MCNC: 8.9 MG/DL (ref 8.6–10.2)
CHLORIDE BLD-SCNC: 93 MEQ/L (ref 98–107)
CK MB: 3.4 NG/ML (ref 0–3.8)
CK MB: 6 NG/ML (ref 0–3.8)
CO2: 23 MEQ/L (ref 22–29)
CREAT SERPL-MCNC: 1.44 MG/DL (ref 0.5–0.9)
CREATINE KINASE-MB INDEX: 1.7 % (ref 0–3.5)
CREATINE KINASE-MB INDEX: 2.2 % (ref 0–3.5)
EOSINOPHILS ABSOLUTE: 0 K/UL (ref 0–0.7)
EOSINOPHILS RELATIVE PERCENT: 0 %
GFR AFRICAN AMERICAN: 42
GFR NON-AFRICAN AMERICAN: 34.7
GLUCOSE BLD-MCNC: 161 MG/DL (ref 74–109)
HCT VFR BLD CALC: 37.7 % (ref 37–47)
HEMOGLOBIN: 12.3 G/DL (ref 12–16)
LYMPHOCYTES ABSOLUTE: 1.1 K/UL (ref 1–4.8)
LYMPHOCYTES RELATIVE PERCENT: 8.8 %
MCH RBC QN AUTO: 30.8 PG (ref 27–31.3)
MCHC RBC AUTO-ENTMCNC: 32.7 % (ref 33–37)
MCV RBC AUTO: 94 FL (ref 82–100)
MONOCYTES ABSOLUTE: 0.1 K/UL (ref 0.2–0.8)
MONOCYTES RELATIVE PERCENT: 1.1 %
NEUTROPHILS ABSOLUTE: 11.2 K/UL (ref 1.4–6.5)
NEUTROPHILS RELATIVE PERCENT: 89.8 %
PDW BLD-RTO: 13.5 % (ref 11.5–14.5)
PLATELET # BLD: 249 K/UL (ref 130–400)
POTASSIUM REFLEX MAGNESIUM: 4.3 MEQ/L (ref 3.5–5.1)
RBC # BLD: 4.01 M/UL (ref 4.2–5.4)
SODIUM BLD-SCNC: 135 MEQ/L (ref 132–144)
TOTAL CK: 270 U/L (ref 0–170)
TROPONIN: <0.01 NG/ML (ref 0–0.01)
TROPONIN: <0.01 NG/ML (ref 0–0.01)
WBC # BLD: 12.5 K/UL (ref 4.8–10.8)

## 2018-03-08 PROCEDURE — 6370000000 HC RX 637 (ALT 250 FOR IP)

## 2018-03-08 PROCEDURE — 94664 DEMO&/EVAL PT USE INHALER: CPT

## 2018-03-08 PROCEDURE — 93010 ELECTROCARDIOGRAM REPORT: CPT | Performed by: INTERNAL MEDICINE

## 2018-03-08 PROCEDURE — 6360000002 HC RX W HCPCS: Performed by: INTERNAL MEDICINE

## 2018-03-08 PROCEDURE — 2580000003 HC RX 258: Performed by: NURSE PRACTITIONER

## 2018-03-08 PROCEDURE — 6360000002 HC RX W HCPCS

## 2018-03-08 PROCEDURE — 6370000000 HC RX 637 (ALT 250 FOR IP): Performed by: NURSE PRACTITIONER

## 2018-03-08 PROCEDURE — 84484 ASSAY OF TROPONIN QUANT: CPT

## 2018-03-08 PROCEDURE — 94760 N-INVAS EAR/PLS OXIMETRY 1: CPT

## 2018-03-08 PROCEDURE — 80048 BASIC METABOLIC PNL TOTAL CA: CPT

## 2018-03-08 PROCEDURE — 85025 COMPLETE CBC W/AUTO DIFF WBC: CPT

## 2018-03-08 PROCEDURE — 94640 AIRWAY INHALATION TREATMENT: CPT

## 2018-03-08 PROCEDURE — 6370000000 HC RX 637 (ALT 250 FOR IP): Performed by: INTERNAL MEDICINE

## 2018-03-08 PROCEDURE — 36415 COLL VENOUS BLD VENIPUNCTURE: CPT

## 2018-03-08 PROCEDURE — 99222 1ST HOSP IP/OBS MODERATE 55: CPT | Performed by: INTERNAL MEDICINE

## 2018-03-08 PROCEDURE — 82553 CREATINE MB FRACTION: CPT

## 2018-03-08 PROCEDURE — 82550 ASSAY OF CK (CPK): CPT

## 2018-03-08 PROCEDURE — 93005 ELECTROCARDIOGRAM TRACING: CPT

## 2018-03-08 PROCEDURE — 2500000003 HC RX 250 WO HCPCS: Performed by: INTERNAL MEDICINE

## 2018-03-08 PROCEDURE — 2060000000 HC ICU INTERMEDIATE R&B

## 2018-03-08 PROCEDURE — 2580000003 HC RX 258: Performed by: INTERNAL MEDICINE

## 2018-03-08 PROCEDURE — 2700000000 HC OXYGEN THERAPY PER DAY

## 2018-03-08 RX ORDER — MORPHINE SULFATE 4 MG/ML
INJECTION, SOLUTION INTRAMUSCULAR; INTRAVENOUS
Status: COMPLETED
Start: 2018-03-08 | End: 2018-03-08

## 2018-03-08 RX ORDER — IPRATROPIUM BROMIDE AND ALBUTEROL SULFATE 2.5; .5 MG/3ML; MG/3ML
1 SOLUTION RESPIRATORY (INHALATION) 3 TIMES DAILY
Status: DISCONTINUED | OUTPATIENT
Start: 2018-03-08 | End: 2018-03-11

## 2018-03-08 RX ORDER — NITROGLYCERIN 0.4 MG/1
TABLET SUBLINGUAL
Status: COMPLETED
Start: 2018-03-08 | End: 2018-03-08

## 2018-03-08 RX ORDER — ALBUTEROL SULFATE 2.5 MG/3ML
2.5 SOLUTION RESPIRATORY (INHALATION)
Status: DISCONTINUED | OUTPATIENT
Start: 2018-03-08 | End: 2018-03-21 | Stop reason: HOSPADM

## 2018-03-08 RX ORDER — MORPHINE SULFATE 2 MG/ML
2 INJECTION, SOLUTION INTRAMUSCULAR; INTRAVENOUS EVERY 4 HOURS PRN
Status: DISCONTINUED | OUTPATIENT
Start: 2018-03-08 | End: 2018-03-21 | Stop reason: HOSPADM

## 2018-03-08 RX ORDER — SODIUM CHLORIDE 9 MG/ML
INJECTION, SOLUTION INTRAVENOUS CONTINUOUS
Status: DISCONTINUED | OUTPATIENT
Start: 2018-03-08 | End: 2018-03-10

## 2018-03-08 RX ORDER — NITROGLYCERIN 0.4 MG/1
0.4 TABLET SUBLINGUAL EVERY 5 MIN PRN
Status: DISCONTINUED | OUTPATIENT
Start: 2018-03-08 | End: 2018-03-21 | Stop reason: HOSPADM

## 2018-03-08 RX ORDER — LORAZEPAM 2 MG/ML
0.5 INJECTION INTRAMUSCULAR ONCE
Status: COMPLETED | OUTPATIENT
Start: 2018-03-08 | End: 2018-03-08

## 2018-03-08 RX ORDER — METOPROLOL TARTRATE 5 MG/5ML
5 INJECTION INTRAVENOUS ONCE
Status: COMPLETED | OUTPATIENT
Start: 2018-03-08 | End: 2018-03-08

## 2018-03-08 RX ORDER — 0.9 % SODIUM CHLORIDE 0.9 %
500 INTRAVENOUS SOLUTION INTRAVENOUS ONCE
Status: COMPLETED | OUTPATIENT
Start: 2018-03-08 | End: 2018-03-08

## 2018-03-08 RX ADMIN — METOPROLOL TARTRATE 5 MG: 5 INJECTION, SOLUTION INTRAVENOUS at 11:57

## 2018-03-08 RX ADMIN — LORAZEPAM 0.5 MG: 2 INJECTION INTRAMUSCULAR; INTRAVENOUS at 13:47

## 2018-03-08 RX ADMIN — IPRATROPIUM BROMIDE AND ALBUTEROL SULFATE 1 AMPULE: .5; 3 SOLUTION RESPIRATORY (INHALATION) at 08:24

## 2018-03-08 RX ADMIN — METHYLPREDNISOLONE SODIUM SUCCINATE 40 MG: 40 INJECTION, POWDER, FOR SOLUTION INTRAMUSCULAR; INTRAVENOUS at 09:51

## 2018-03-08 RX ADMIN — Medication 10 ML: at 00:13

## 2018-03-08 RX ADMIN — SIMVASTATIN 40 MG: 40 TABLET, FILM COATED ORAL at 00:11

## 2018-03-08 RX ADMIN — SODIUM CHLORIDE: 9 INJECTION, SOLUTION INTRAVENOUS at 13:51

## 2018-03-08 RX ADMIN — ACETAMINOPHEN 650 MG: 325 TABLET, FILM COATED ORAL at 17:48

## 2018-03-08 RX ADMIN — NITROGLYCERIN: 0.4 TABLET SUBLINGUAL at 09:52

## 2018-03-08 RX ADMIN — ALBUTEROL SULFATE 2.5 MG: 2.5 SOLUTION RESPIRATORY (INHALATION) at 00:50

## 2018-03-08 RX ADMIN — Medication 2 MG: at 22:15

## 2018-03-08 RX ADMIN — NITROGLYCERIN 1 INCH: 20 OINTMENT TOPICAL at 17:48

## 2018-03-08 RX ADMIN — METHYLPREDNISOLONE SODIUM SUCCINATE 40 MG: 40 INJECTION, POWDER, FOR SOLUTION INTRAMUSCULAR; INTRAVENOUS at 16:00

## 2018-03-08 RX ADMIN — IPRATROPIUM BROMIDE AND ALBUTEROL SULFATE 1 AMPULE: .5; 3 SOLUTION RESPIRATORY (INHALATION) at 19:19

## 2018-03-08 RX ADMIN — ENOXAPARIN SODIUM 30 MG: 100 INJECTION SUBCUTANEOUS at 11:58

## 2018-03-08 RX ADMIN — AZITHROMYCIN MONOHYDRATE 500 MG: 500 INJECTION, POWDER, LYOPHILIZED, FOR SOLUTION INTRAVENOUS at 00:12

## 2018-03-08 RX ADMIN — NITROGLYCERIN: 0.4 TABLET SUBLINGUAL at 20:00

## 2018-03-08 RX ADMIN — SODIUM CHLORIDE 500 ML: 9 INJECTION, SOLUTION INTRAVENOUS at 11:58

## 2018-03-08 RX ADMIN — SODIUM CHLORIDE: 9 INJECTION, SOLUTION INTRAVENOUS at 00:12

## 2018-03-08 RX ADMIN — METHYLPREDNISOLONE SODIUM SUCCINATE 40 MG: 40 INJECTION, POWDER, FOR SOLUTION INTRAMUSCULAR; INTRAVENOUS at 00:12

## 2018-03-08 RX ADMIN — SIMVASTATIN 40 MG: 40 TABLET, FILM COATED ORAL at 21:37

## 2018-03-08 RX ADMIN — IPRATROPIUM BROMIDE AND ALBUTEROL SULFATE 1 AMPULE: .5; 3 SOLUTION RESPIRATORY (INHALATION) at 12:55

## 2018-03-08 RX ADMIN — Medication: at 15:51

## 2018-03-08 ASSESSMENT — PAIN SCALES - GENERAL
PAINLEVEL_OUTOF10: 8
PAINLEVEL_OUTOF10: 0
PAINLEVEL_OUTOF10: 3
PAINLEVEL_OUTOF10: 2
PAINLEVEL_OUTOF10: 0
PAINLEVEL_OUTOF10: 8

## 2018-03-08 ASSESSMENT — PAIN DESCRIPTION - ONSET: ONSET: ON-GOING

## 2018-03-08 ASSESSMENT — PAIN DESCRIPTION - DESCRIPTORS: DESCRIPTORS: DISCOMFORT

## 2018-03-08 ASSESSMENT — PAIN DESCRIPTION - PROGRESSION: CLINICAL_PROGRESSION: NOT CHANGED

## 2018-03-08 ASSESSMENT — ACTIVITIES OF DAILY LIVING (ADL): EFFECT OF PAIN ON DAILY ACTIVITIES: UNABLE TO SLEEP

## 2018-03-08 ASSESSMENT — PAIN DESCRIPTION - FREQUENCY: FREQUENCY: INTERMITTENT

## 2018-03-08 ASSESSMENT — PAIN DESCRIPTION - LOCATION: LOCATION: CHEST

## 2018-03-08 ASSESSMENT — PAIN DESCRIPTION - PAIN TYPE: TYPE: ACUTE PAIN

## 2018-03-08 NOTE — CONSULTS
Consults   Pulmonary Medicine  Consult Note      Reason for consultation: COPD exacerbation    Consulting physician: Dr. Wilkins Livers:    This is a 20-year-old female who was presented to [facility] emergency room with increased shortness of breath. Patient was seen in office for routine follow up. Patient was found having increased shortness of breath chest tightness and diffuse wheezing. She was found having respiratory distress emergency room. She has no chest pain with radiation. She has no fever or chills no nausea vomiting diarrhea or abdominal pain. Patient will was admitted with COPD exacerbation. Patient had rapid response due to chest pain. She has no nausea vomiting diarrhea or abdominal pain no fever or chills. Past Medical History:        Diagnosis Date    Arthritis     COPD (chronic obstructive pulmonary disease) ([facility]     Dr. Luther De Los Santos murmur     Hypertension     Normal cardiac stress test 2015       Past Surgical History:        Procedure Laterality Date    APPENDECTOMY      BACK SURGERY  2006    CARDIAC CATHETERIZATION  6-09    CHOLECYSTECTOMY  1976    COLONOSCOPY  2004    negative    EYE SURGERY      bilateral-catarct    HERNIA REPAIR      HYSTERECTOMY  1976    NASAL SEPTUM SURGERY      TONSILLECTOMY         Social History:     reports that she quit smoking about 25 years ago. She has a 200.00 pack-year smoking history. She has never used smokeless tobacco. She reports that she does not drink alcohol or use drugs.     Family History:       Problem Relation Age of Onset    Arthritis Mother     Diabetes Father        Allergies:  Codeine        MEDICATIONS during current hospitalization:    Continuous Infusions:   sodium chloride         Scheduled Meds:   ipratropium-albuterol  1 ampule Inhalation TID    sodium chloride  500 mL Intravenous Once    LORazepam  0.5 mg Intravenous Once    sodium chloride flush  10 mL Intravenous 2 times per day  enoxaparin  30 mg Subcutaneous Daily    methylPREDNISolone  40 mg Intravenous Q8H    azithromycin  500 mg Intravenous Q24H    aspirin  81 mg Oral Daily    nebivolol  20 mg Oral Daily    hydrochlorothiazide  25 mg Oral Daily    montelukast  10 mg Oral Daily    simvastatin  40 mg Oral Nightly    valsartan  160 mg Oral Daily       PRN Meds:albuterol, sodium chloride flush, acetaminophen, magnesium hydroxide, ondansetron    REVIEW OF SYSTEMS:  As in history of present illness  Other 14 point review of system is negative. PHYSICAL EXAM:    Vitals:  BP (!) 140/85   Pulse 108   Temp 97.5 °F (36.4 °C) (Oral)   Resp 18   Ht 5' 3\" (1.6 m)   Wt 172 lb (78 kg)   SpO2 100%   BMI 30.47 kg/m²   General:Alert, awake, Oriented x3  patient become tachypneic with any conversation. Head: Atraumatic , Normocephalic   Eyes: PERRL. No sclera icterus. No conjunctival injection. No discharge   ENT: No nasal  discharge. Pharynx clear. Neck:  Trachea midline. No thyromegaly, no JVD, No cervical adenopathy. Chest : Bilaterally symmetrical ,Normal effort,  No accessory muscle use  Lung : Diminished breath sound bilaterally No Rales. Scattered wheezing. No rhonchi. No dullness on percussion. Heart[de-identified] Normal  rate. Regular rhythm. No mumur ,  Rub or gallop  ABD: Non-tender. Non-distended. No masses. No organmegaly. Normal bowel sounds. No hernia. Musculoskeleton: normal range of motion in all extremites, strength and tone   Extremities: No pitting ,Cyanosis ,No clubbing  Neuro: no cranial nerve abnormality, normal reflex and sensation, no focal weakness   Skin: Warm and dry. No erythema rash on exposed extremities.         Data Review  Recent Labs      03/07/18   1415  03/08/18   0618   WBC  8.1  12.5*   HGB  13.0  12.3   HCT  38.1  37.7   PLT  264  249      Recent Labs      03/07/18   1415  03/08/18   0618   NA  134  135   K  4.5  4.3   CL  94*  93*   CO2  26  23   BUN  29*  28*   CREATININE  1.50*  1.44*   GLUCOSE

## 2018-03-08 NOTE — PROGRESS NOTES
Beverley Freed Pew:  \"Pt home meds in and ready to be restarted.  ALSO, pt needs a prn breathing treatment\"

## 2018-03-08 NOTE — CARE COORDINATION
Personalized Discharge Plan of Care for:  Filomena Delarosa, 1934    Based on our assessment, your plan of care includes:     Patient has all needed scripts      Patient has all required DME-NEEDS PORTABLE O2     Patient has home oxygen set up if ordered-HAS HOME O2, NEEDS PORTABLES     Patient has nebulizer and associated equipment if ordered-HAS NEBS     Patient has been assessed for medication affordability-DONE, NO NEEDS     Patient has been assessed for any transportation barriers-NONE    Respiratory therapy consult that includes bedside instructions on administration of nebulizers and/or inhalers, and assessment of oxygen and equipment needs in the home     Consult with a Pulmonologist-DONE     Consult with a Cardiologist-DONE     Consult with our Transitional Care Nurses for education and follow up-WILL FOLLOW     Palliative Care Consult-HAS BROCHURE, CALL TO [facility] TO SPEAK WITH PATIENT     Pulmonary Rehab Order for COPD, PN and CHF (if EF>35%)-REFUSED     Date of follow-up appointment with pulmonologist and/or PCP within 7 days of  Discharge     Date of follow-up appointment with cardiologist within 4 days for CHF and PCP  within 10 days of discharge        Electronically by Estella Hope RN  on 3/8/2018 at 5:29 PM

## 2018-03-08 NOTE — PROGRESS NOTES
Physician Progress Note    3/8/2018   10:52 AM    Name:  Dior Thomas  MRN:    [id number]      Day: 1     Admit Date: 3/7/2018  2:12 PM  PCP: Erick Moreno MD    Code Status:  Full Code    Subjective:      rapid response called for ongoing chest pain and dyspnea. Vitals stable. Chest pain improved with nitro but required 3 doses. BP is WNL. EKG without new changes, troponin neg yesterday, adding on to am labs another one. Physical Examination:      Vitals:  BP (!) 168/78   Pulse 92   Temp 98.1 °F (36.7 °C) (Oral)   Resp 18   Ht 5' 3\" (1.6 m)   Wt 172 lb (78 kg)   SpO2 100%   BMI 30.47 kg/m²   Temp (24hrs), Av.5 °F (36.9 °C), Min:98.1 °F (36.7 °C), Max:98.9 °F (37.2 °C)      General appearance: alert, cooperative and no distress  Mental Status: oriented to person, place and time and normal affect  Lungs: clear to auscultation bilaterally, normal effort  Heart: regular rate and rhythm, no murmur  Abdomen: soft, nontender, nondistended, bowel sounds present, no masses  Extremities: no edema, redness, tenderness in the calves  Skin: no gross lesions, rashes  Psych: appear anxious     Data:     Labs:  Recent Labs      18   1415  18   0618   WBC  8.1  12.5*   HGB  13.0  12.3   PLT  264  249     Recent Labs      18   1415  18   0618   NA  134  135   K  4.5  4.3   CL  94*  93*   CO2  26  23   BUN  29*  28*   CREATININE  1.50*  1.44*   GLUCOSE  80  161*     Recent Labs      18   1415   AST  25   ALT  29   BILITOT  0.3   ALKPHOS  55       Assessment and Plan:          Acute Hospital issues:    # chest pain- typical symptoms- likely related to uncontrolled heart rate is the setting of missing BB dose- EKG without new ischemic changes as compared to prior- improved with nitro to some extent, given morphine and 5 IV lopressor with resolution of pain. Discussed with cardiology, Dr Shashi Lala, will evaluate soon.  Will resume home beta blockers, transfer to  for more monitoring     #

## 2018-03-08 NOTE — CONSULTS
Inpatient consult to Cardiology  Consult performed by: Namrata Grant  Consult ordered by: Elizabeth Griffin              [facility]        Date of Consult:  3/8/2018    Patient:  Geovanny Garcia    : 1934  CSN: [id number]    Consulting Cardiologist: Mingo Henning MD     Primary Cardiologist: Trav Holland MD    Requesting Physician:  [facility],     Reason for Consult:  Tachycardia, Chest Pain      Assessment:    1. Chest Pain, typical , atypical  2. Tachycardia, now resolved with medications. 3. COPD Exacerbation, improved  4. Negative Cardiac Cath ,  5. Negative Myoview Stress   6. Normal LV Function, LVEF 60%  7. HTN  8. HLP  9. Anemia, Mild  10. Renal Insufficiency  11. DJD  12. Lumbar Spinal Stenosis , Post Laminectomy  13. Past Tobacco use  14. Overweight    Plan:    1. Cardiac Supportive Care  2. Resume home medications including Bystolic  3. Telemetry monitoring  4. Lexiscan myoview Stress in AM  5. Echocardiogram In AM  6. If above negative and patient stable and OK with Primary and Pulmonary, OK ot DC home From CV point. 7. Further Recommendations to follow  8. See Orders        HISTORY OF PRESENT ILLNESS:      Geovanny Garcia is a pleasant 80 y.o. female who presented to [facility] AT [place] emergency room with worsening shortness of breath and chest pain symptoms. Patient was found to have tachycardia and COPD exacerbation. She has the above-noted past and current history. Cardiology was asked to see regarding treatment and evaluation. .    Patient Follows with Dr Indra Fritz. Patient History and Records, EMR reviewed. Patient Interviewed and examined. She states that she's been doing well overall. But she does have worsening shortness of breath she does wear oxygen at home. She at night has had chest discomfort associated with pounding. She also has had chest discomfort in her chest with ambulation.   It times radiates to her left arm up. It's usually short-lived and relieved with inactivity. She's not use nitroglycerin. She's had no lower extremity pedal edema. Denies LH, Dizziness, TIA or CVA Symptoms. No Orthopnea, Edema or CHF symptoms. No Palpitations. No Syncope. No Fever, Chills or Cold symptoms. No GI,  or Bleeding complaints. She's currently asymptomatic other than shortness of breath. Cardiac and general ROS otherwise negative. [address] otherwise negative other than noted. Past Medical History:   Diagnosis Date    Arthritis     COPD (chronic obstructive pulmonary disease) ([facility] [address]     Dr. Mirtha Borden murmur     Hypertension     Normal cardiac stress test 2015       Past Surgical History:   Procedure Laterality Date    APPENDECTOMY      BACK SURGERY  2006    CARDIAC CATHETERIZATION  6-09    CHOLECYSTECTOMY  1976    COLONOSCOPY  2004    negative    EYE SURGERY      bilateral-catarct    HERNIA REPAIR      HYSTERECTOMY  1976    NASAL SEPTUM SURGERY      TONSILLECTOMY         Prior to Admission medications    Medication Sig Start Date End Date Taking?  Authorizing Provider   Misc Natural Products (GLUCOSAMINE CHOND COMPLEX/MSM PO) Take 1 tablet by mouth 2 times daily   Yes Historical Provider, MD   simvastatin (ZOCOR) 40 MG tablet TAKE 1 TABLET BY MOUTH EVERY EVENING 1/18/18  Yes Mickeal Mask, MD   montelukast (SINGULAIR) 10 MG tablet TAKE 1 TABLET BY MOUTH DAILY 12/21/17  Yes Mickeal Mask, MD   nitroGLYCERIN (NITROSTAT) 0.4 MG SL tablet Place 1 tablet under the tongue every 5 minutes as needed for Chest pain 6/30/17  Yes Mickeal Mask, MD   valsartan (DIOVAN) 160 MG tablet Take 160 mg by mouth daily   Yes Historical Provider, MD   aspirin 81 MG tablet Take 81 mg by mouth daily   Yes Historical Provider, MD   Multiple Vitamins-Minerals (CENTRUM SILVER ADULT 50+ PO) Take by mouth   Yes Historical Provider, MD   BYSTOLIC 20 MG TABS tablet 20 mg daily  3/13/17  Yes

## 2018-03-08 NOTE — PROGRESS NOTES
chronic)  []   Severe or Chronic w/ Exacerbation  [x]     Surgical Status No [x]   Surgeries     General []   Surgery Lower []   Abdominal Thoracic or []   Upper Abdominal Thoracic with  PulmonaryDisorder  []     Chest X-ray Clear/Not  Ordered     [x]  Chronic Changes  Results Pending  []  Infiltrates, atelectasis, pleural effusion, or edema  []  Infiltrates in more than one lobe []  Infiltrate + Atelectasis, &/or pleural effusion  []    Respiratory Pattern Regular,  RR = 12-20 [x]  Increased,  RR = 21-25 []  BECKER, irregular,  or RR = 26-30 []  Decreased FEV1  or RR = 31-35 []  Severe SOB, use  of accessory muscles, or RR ? 35  []    Mental Status Alert, oriented,  Cooperative [x]  Confused but Follows commands []  Lethargic or unable to follow commands []  Obtunded  []  Comatose  []    Breath Sounds Clear to  auscultation  []  Decreased unilaterally or  in bases only []  Decreased  bilaterally  []  Crackles or intermittent wheezes [x]  Wheezes []    Cough Strong, Spontan., & nonproductive [x]  Strong,  spontaneous, &  productive []  Weak,  Nonproductive []  Weak, productive or  with wheezes []  No spontaneous  cough or may require suctioning []    Level of Activity Ambulatory []  Ambulatory w/ Assist  [x]  Non-ambulatory []  Paraplegic []  Quadriplegic []    Total    Score:___8____     Triage Score:__4______      Tri       Triage:     1. (>20) Freq: Q3    2. (16-20) Freq: Q4   3. (11-15) Freq: QID & Albuterol Q2 PRN    4. (6-10) Freq: TID & Albuterol Q2 PRN    5. (0-5) Freq Q4prn

## 2018-03-09 ENCOUNTER — APPOINTMENT (OUTPATIENT)
Dept: NUCLEAR MEDICINE | Age: 83
DRG: 190 | End: 2018-03-09
Payer: MEDICARE

## 2018-03-09 ENCOUNTER — APPOINTMENT (OUTPATIENT)
Dept: NON INVASIVE DIAGNOSTICS | Age: 83
DRG: 190 | End: 2018-03-09
Payer: MEDICARE

## 2018-03-09 ENCOUNTER — APPOINTMENT (OUTPATIENT)
Dept: GENERAL RADIOLOGY | Age: 83
DRG: 190 | End: 2018-03-09
Payer: MEDICARE

## 2018-03-09 LAB
ALBUMIN SERPL-MCNC: 3.8 G/DL (ref 3.9–4.9)
ALP BLD-CCNC: 43 U/L (ref 40–130)
ALT SERPL-CCNC: 26 U/L (ref 0–33)
ANION GAP SERPL CALCULATED.3IONS-SCNC: 18 MEQ/L (ref 7–13)
AST SERPL-CCNC: 26 U/L (ref 0–35)
BILIRUB SERPL-MCNC: 0.2 MG/DL (ref 0–1.2)
BUN BLDV-MCNC: 37 MG/DL (ref 8–23)
C-REACTIVE PROTEIN, HIGH SENSITIVITY: 0.7 MG/L (ref 0–5)
CALCIUM SERPL-MCNC: 7.8 MG/DL (ref 8.6–10.2)
CHLORIDE BLD-SCNC: 98 MEQ/L (ref 98–107)
CO2: 22 MEQ/L (ref 22–29)
CREAT SERPL-MCNC: 1.46 MG/DL (ref 0.5–0.9)
GFR AFRICAN AMERICAN: 41.4
GFR NON-AFRICAN AMERICAN: 34.2
GLOBULIN: 2.1 G/DL (ref 2.3–3.5)
GLUCOSE BLD-MCNC: 103 MG/DL (ref 74–109)
HCT VFR BLD CALC: 31.8 % (ref 37–47)
HEMOGLOBIN: 10.4 G/DL (ref 12–16)
LV EF: 65 %
LVEF MODALITY: NORMAL
MAGNESIUM: 2.1 MG/DL (ref 1.7–2.3)
MCH RBC QN AUTO: 30.9 PG (ref 27–31.3)
MCHC RBC AUTO-ENTMCNC: 32.6 % (ref 33–37)
MCV RBC AUTO: 94.7 FL (ref 82–100)
PDW BLD-RTO: 13.7 % (ref 11.5–14.5)
PLATELET # BLD: 238 K/UL (ref 130–400)
POTASSIUM SERPL-SCNC: 3.8 MEQ/L (ref 3.5–5.1)
RBC # BLD: 3.36 M/UL (ref 4.2–5.4)
SEDIMENTATION RATE, ERYTHROCYTE: 13 MM (ref 0–30)
SODIUM BLD-SCNC: 138 MEQ/L (ref 132–144)
TOTAL PROTEIN: 5.9 G/DL (ref 6.4–8.1)
TROPONIN: <0.01 NG/ML (ref 0–0.01)
TROPONIN: <0.01 NG/ML (ref 0–0.01)
WBC # BLD: 21.1 K/UL (ref 4.8–10.8)

## 2018-03-09 PROCEDURE — 2060000000 HC ICU INTERMEDIATE R&B

## 2018-03-09 PROCEDURE — 93017 CV STRESS TEST TRACING ONLY: CPT

## 2018-03-09 PROCEDURE — 6360000002 HC RX W HCPCS: Performed by: INTERNAL MEDICINE

## 2018-03-09 PROCEDURE — 6370000000 HC RX 637 (ALT 250 FOR IP): Performed by: NURSE PRACTITIONER

## 2018-03-09 PROCEDURE — 36415 COLL VENOUS BLD VENIPUNCTURE: CPT

## 2018-03-09 PROCEDURE — 84484 ASSAY OF TROPONIN QUANT: CPT

## 2018-03-09 PROCEDURE — 80053 COMPREHEN METABOLIC PANEL: CPT

## 2018-03-09 PROCEDURE — 2580000003 HC RX 258: Performed by: INTERNAL MEDICINE

## 2018-03-09 PROCEDURE — 93010 ELECTROCARDIOGRAM REPORT: CPT | Performed by: INTERNAL MEDICINE

## 2018-03-09 PROCEDURE — 86141 C-REACTIVE PROTEIN HS: CPT

## 2018-03-09 PROCEDURE — 93306 TTE W/DOPPLER COMPLETE: CPT

## 2018-03-09 PROCEDURE — 85027 COMPLETE CBC AUTOMATED: CPT

## 2018-03-09 PROCEDURE — 83735 ASSAY OF MAGNESIUM: CPT

## 2018-03-09 PROCEDURE — 3430000000 HC RX DIAGNOSTIC RADIOPHARMACEUTICAL: Performed by: INTERNAL MEDICINE

## 2018-03-09 PROCEDURE — 6370000000 HC RX 637 (ALT 250 FOR IP): Performed by: INTERNAL MEDICINE

## 2018-03-09 PROCEDURE — 85652 RBC SED RATE AUTOMATED: CPT

## 2018-03-09 PROCEDURE — 99232 SBSQ HOSP IP/OBS MODERATE 35: CPT | Performed by: INTERNAL MEDICINE

## 2018-03-09 PROCEDURE — A9502 TC99M TETROFOSMIN: HCPCS | Performed by: INTERNAL MEDICINE

## 2018-03-09 PROCEDURE — 71046 X-RAY EXAM CHEST 2 VIEWS: CPT

## 2018-03-09 PROCEDURE — 94640 AIRWAY INHALATION TREATMENT: CPT

## 2018-03-09 PROCEDURE — 93005 ELECTROCARDIOGRAM TRACING: CPT

## 2018-03-09 PROCEDURE — 2700000000 HC OXYGEN THERAPY PER DAY

## 2018-03-09 PROCEDURE — 78452 HT MUSCLE IMAGE SPECT MULT: CPT

## 2018-03-09 RX ORDER — SIMVASTATIN 20 MG
20 TABLET ORAL NIGHTLY
Status: DISCONTINUED | OUTPATIENT
Start: 2018-03-09 | End: 2018-03-21 | Stop reason: HOSPADM

## 2018-03-09 RX ORDER — SODIUM CHLORIDE 0.9 % (FLUSH) 0.9 %
10 SYRINGE (ML) INJECTION PRN
Status: DISCONTINUED | OUTPATIENT
Start: 2018-03-09 | End: 2018-03-21 | Stop reason: HOSPADM

## 2018-03-09 RX ORDER — DOCUSATE SODIUM 100 MG/1
100 CAPSULE, LIQUID FILLED ORAL 2 TIMES DAILY
Status: DISCONTINUED | OUTPATIENT
Start: 2018-03-09 | End: 2018-03-21 | Stop reason: HOSPADM

## 2018-03-09 RX ADMIN — METHYLPREDNISOLONE SODIUM SUCCINATE 40 MG: 40 INJECTION, POWDER, FOR SOLUTION INTRAMUSCULAR; INTRAVENOUS at 14:15

## 2018-03-09 RX ADMIN — SODIUM CHLORIDE: 9 INJECTION, SOLUTION INTRAVENOUS at 04:25

## 2018-03-09 RX ADMIN — NITROGLYCERIN 1 INCH: 20 OINTMENT TOPICAL at 06:48

## 2018-03-09 RX ADMIN — VERAPAMIL HYDROCHLORIDE 180 MG: 180 TABLET, FILM COATED, EXTENDED RELEASE ORAL at 22:33

## 2018-03-09 RX ADMIN — NITROGLYCERIN 1 INCH: 20 OINTMENT TOPICAL at 22:45

## 2018-03-09 RX ADMIN — NITROGLYCERIN 0.4 MG: 0.4 TABLET SUBLINGUAL at 09:58

## 2018-03-09 RX ADMIN — Medication 10 ML: at 14:21

## 2018-03-09 RX ADMIN — SODIUM CHLORIDE: 9 INJECTION, SOLUTION INTRAVENOUS at 01:14

## 2018-03-09 RX ADMIN — Medication 10 ML: at 09:48

## 2018-03-09 RX ADMIN — SIMVASTATIN 20 MG: 20 TABLET, FILM COATED ORAL at 22:36

## 2018-03-09 RX ADMIN — HYDROCHLOROTHIAZIDE 25 MG: 25 TABLET ORAL at 14:16

## 2018-03-09 RX ADMIN — TETROFOSMIN 30.1 MILLICURIE: 0.23 INJECTION, POWDER, LYOPHILIZED, FOR SOLUTION INTRAVENOUS at 09:45

## 2018-03-09 RX ADMIN — ALBUTEROL SULFATE 2.5 MG: 2.5 SOLUTION RESPIRATORY (INHALATION) at 04:33

## 2018-03-09 RX ADMIN — Medication 10 ML: at 07:39

## 2018-03-09 RX ADMIN — IPRATROPIUM BROMIDE AND ALBUTEROL SULFATE 1 AMPULE: .5; 3 SOLUTION RESPIRATORY (INHALATION) at 08:11

## 2018-03-09 RX ADMIN — METHYLPREDNISOLONE SODIUM SUCCINATE 40 MG: 40 INJECTION, POWDER, FOR SOLUTION INTRAMUSCULAR; INTRAVENOUS at 01:02

## 2018-03-09 RX ADMIN — METHYLPREDNISOLONE SODIUM SUCCINATE 40 MG: 40 INJECTION, POWDER, FOR SOLUTION INTRAMUSCULAR; INTRAVENOUS at 22:32

## 2018-03-09 RX ADMIN — AZITHROMYCIN MONOHYDRATE 500 MG: 500 INJECTION, POWDER, LYOPHILIZED, FOR SOLUTION INTRAVENOUS at 01:02

## 2018-03-09 RX ADMIN — TETROFOSMIN 12 MILLICURIE: 0.23 INJECTION, POWDER, LYOPHILIZED, FOR SOLUTION INTRAVENOUS at 07:39

## 2018-03-09 RX ADMIN — IPRATROPIUM BROMIDE AND ALBUTEROL SULFATE 1 AMPULE: .5; 3 SOLUTION RESPIRATORY (INHALATION) at 14:21

## 2018-03-09 RX ADMIN — DOCUSATE SODIUM 100 MG: 100 CAPSULE, LIQUID FILLED ORAL at 22:33

## 2018-03-09 RX ADMIN — IPRATROPIUM BROMIDE AND ALBUTEROL SULFATE 1 AMPULE: .5; 3 SOLUTION RESPIRATORY (INHALATION) at 20:33

## 2018-03-09 RX ADMIN — REGADENOSON 0.4 MG: 0.08 INJECTION, SOLUTION INTRAVENOUS at 09:49

## 2018-03-09 RX ADMIN — Medication 10 ML: at 09:50

## 2018-03-09 RX ADMIN — Medication 10 ML: at 22:34

## 2018-03-09 RX ADMIN — ENOXAPARIN SODIUM 30 MG: 100 INJECTION SUBCUTANEOUS at 14:17

## 2018-03-09 RX ADMIN — AZITHROMYCIN MONOHYDRATE 500 MG: 500 INJECTION, POWDER, LYOPHILIZED, FOR SOLUTION INTRAVENOUS at 22:44

## 2018-03-09 RX ADMIN — SODIUM CHLORIDE: 9 INJECTION, SOLUTION INTRAVENOUS at 15:23

## 2018-03-09 RX ADMIN — NITROGLYCERIN 1 INCH: 20 OINTMENT TOPICAL at 14:23

## 2018-03-09 RX ADMIN — NEBIVOLOL HYDROCHLORIDE 20 MG: 5 TABLET ORAL at 14:17

## 2018-03-09 RX ADMIN — ASPIRIN 81 MG 81 MG: 81 TABLET ORAL at 14:16

## 2018-03-09 ASSESSMENT — PAIN SCALES - GENERAL
PAINLEVEL_OUTOF10: 0
PAINLEVEL_OUTOF10: 8
PAINLEVEL_OUTOF10: 0
PAINLEVEL_OUTOF10: 0
PAINLEVEL_OUTOF10: 8
PAINLEVEL_OUTOF10: 0
PAINLEVEL_OUTOF10: 0
PAINLEVEL_OUTOF10: 6
PAINLEVEL_OUTOF10: 0

## 2018-03-09 ASSESSMENT — PAIN DESCRIPTION - ORIENTATION
ORIENTATION: MID;UPPER

## 2018-03-09 ASSESSMENT — PAIN DESCRIPTION - FREQUENCY: FREQUENCY: INTERMITTENT

## 2018-03-09 ASSESSMENT — PAIN DESCRIPTION - PAIN TYPE
TYPE: ACUTE PAIN

## 2018-03-09 ASSESSMENT — PAIN DESCRIPTION - LOCATION
LOCATION: CHEST

## 2018-03-09 ASSESSMENT — PAIN DESCRIPTION - DESCRIPTORS
DESCRIPTORS: DISCOMFORT

## 2018-03-09 ASSESSMENT — PAIN DESCRIPTION - ONSET: ONSET: GRADUAL

## 2018-03-09 NOTE — PROGRESS NOTES
vascular unremarkable. Right sided trachea. No focal infiltrates. No Pneumothoraces. NO ACUTE ACTIVE CARDIOPULMONARY PROCESS    IMPRESSION AND SUGGESTION:     1. Acute COPD exacerbation secondary to bronchitis  2. Acute respiratory distress , improved  3. Chest pain rule out etiology  4. Hypertension  5. Anxiety     Continue nebulizer with DuoNeb 3 times a day and albuterol every 2 hours when necessary She is on Solu-Medrol 40 mg every 8 hourly and Zithromax 500 mg every 24 hourly and Singulair 10 mg daily. Chest x-ray showing no definite infiltration. He had stress test today pending results  . At this time continue bronchodilator IV steroid as before and Pulmicort 0.5 mg twice a day.    will follow         Electronically signed by Ligia Odom MD, [facility] on 3/9/2018 at 5:08 PM

## 2018-03-09 NOTE — PROGRESS NOTES
CARDIOLOGY [facility] PROGRESS NOTE         3/9/2018      Sharan Crawford    [id number]  1934    Rounding MD: Braulio Castaneda MD ,[facility]  Primary Cardiologist: Joselyn Fatima MD     Requesting Physician:  Rickey Recinos DO     Reason for Initial Consult:  Tachycardia, Chest Pain     SUBJECTIVE:     Patient has SOB, but no CP. BP and HR improved with RX. States that medications are incorrect and also wants to switch off Bystolic due to cost.     Cardiac and general ROS otherwise negative and unchanged.     Assessment:     1. Chest Pain, typical , atypical  2. Tachycardia, now resolved with medications. 3. COPD Exacerbation, improved  4. Negative Cardiac Cath 2009,  5. Negative Myoview Stress 2015  6. Normal LV Function, LVEF 60%  7. HTN  8. HLP  9. Anemia, Mild  10. Renal Insufficiency  11. DJD  12. Lumbar Spinal Stenosis , Post Laminectomy  13. Past Tobacco use  14. Overweight     Plan:     1. Cardiac Supportive Care  2. Resume home medications including Bystolic  3. Telemetry monitoring  4. Lexiscan myoview Stress in AM, review  5. Echocardiogram In AM, review  6. If above negative and patient stable and OK with Primary and Pulmonary, OK ot DC home From CV point. 7. Further Recommendations to follow  8. See Orders           HISTORY OF PRESENT ILLNESS:      Sharan Crawford is a pleasant 80 y.o. female who presented to [facility] [place] [facility] emergency room with worsening shortness of breath and chest pain symptoms. Patient was found to have tachycardia and COPD exacerbation. She has the above-noted past and current history. Cardiology was asked to see regarding treatment and evaluation. .     Patient Follows with Dr Paty Morillo.     Patient History and Records, EMR reviewed. Patient Interviewed and examined.     She states that she's been doing well overall. But she does have worsening shortness of breath she does wear oxygen at home.   She at night has had chest discomfort associated with pounding. She also has had chest discomfort in her chest with ambulation. It times radiates to her left arm up. It's usually short-lived and relieved with inactivity. She's not use nitroglycerin. She's had no lower extremity pedal edema.       Denies LH, Dizziness, TIA or CVA Symptoms. No Orthopnea, Edema or CHF symptoms. No Palpitations. No Syncope. No Fever, Chills or Cold symptoms. No GI,  or Bleeding complaints.     She's currently asymptomatic other than shortness of breath.     Cardiac and general ROS otherwise negative.     14 System ROS otherwise negative other than noted.         OBJECTIVE:     MEDICATIONS:     Scheduled Meds:   ipratropium-albuterol  1 ampule Inhalation TID    nitroglycerin  1 inch Topical 3 times per day    sodium chloride flush  10 mL Intravenous 2 times per day    enoxaparin  30 mg Subcutaneous Daily    methylPREDNISolone  40 mg Intravenous Q8H    azithromycin  500 mg Intravenous Q24H    aspirin  81 mg Oral Daily    nebivolol  20 mg Oral Daily    hydrochlorothiazide  25 mg Oral Daily    montelukast  10 mg Oral Daily    simvastatin  40 mg Oral Nightly    valsartan  160 mg Oral Daily     Continuous Infusions:   sodium chloride 100 mL/hr at 03/09/18 1523     PRN Meds:sodium chloride flush, albuterol, nitroGLYCERIN, morphine, sodium chloride flush, acetaminophen, magnesium hydroxide, ondansetron    PHYSICAL EXAM:    CURRENT VITALS: /63   Pulse 100   Temp 97.9 °F (36.6 °C)   Resp 18   Ht 5' 3\" (1.6 m)   Wt 172 lb (78 kg)   SpO2 97%   BMI 30.47 kg/m²     CONSTITUTIONAL:  awake, alert, cooperative, no apparent distress,   ENT:  Normocephalic, without obvious abnormality, atraumatic, sinuses nontender on palpation, external ears without lesions,  NECK:  Supple, symmetrical, trachea midline, no adenopathy, thyroid symmetric, not enlarged and no tenderness, skin normal, No bruits.   LUNGS:  Increased work of breathing, decreased air exchange. Bilateral wheezing  CARDIOVASCULAR:  Normal apical impulse, regular rate and rhythm, normal S1 and S2,  2/6 Systolic murmur noted. ABDOMEN:  Obese, normal bowel sounds, soft, non-distended, non-tender, no masses palpated, no hepatosplenomegally  EXTREMETIES: No edema, Pulses Strong Thruout. No ulcers. NEUROLOGIC:  Awake, alert, oriented to name, place and time. Following all commands and moving all extremties.   SKIN:  no bruising or bleeding, normal skin color, texture, turgor and no rashes     Data:        LABS:      Recent Results (from the past 24 hour(s))   Troponin    Collection Time: 03/08/18 10:41 PM   Result Value Ref Range    Troponin <0.010 0.000 - 0.010 ng/mL   Troponin    Collection Time: 03/09/18  5:12 AM   Result Value Ref Range    Troponin <0.010 0.000 - 0.010 ng/mL   CBC    Collection Time: 03/09/18  5:13 AM   Result Value Ref Range    WBC 21.1 (H) 4.8 - 10.8 K/uL    RBC 3.36 (L) 4.20 - 5.40 M/uL    Hemoglobin 10.4 (L) 12.0 - 16.0 g/dL    Hematocrit 31.8 (L) 37.0 - 47.0 %    MCV 94.7 82.0 - 100.0 fL    MCH 30.9 27.0 - 31.3 pg    MCHC 32.6 (L) 33.0 - 37.0 %    RDW 13.7 11.5 - 14.5 %    Platelets 098 797 - 845 K/uL   Comprehensive Metabolic Panel    Collection Time: 03/09/18  5:13 AM   Result Value Ref Range    Sodium 138 132 - 144 mEq/L    Potassium 3.8 3.5 - 5.1 mEq/L    Chloride 98 98 - 107 mEq/L    CO2 22 22 - 29 mEq/L    Anion Gap 18 (H) 7 - 13 mEq/L    Glucose 103 74 - 109 mg/dL    BUN 37 (H) 8 - 23 mg/dL    CREATININE 1.46 (H) 0.50 - 0.90 mg/dL    GFR Non- 34.2 (L) >60    GFR  41.4 (L) >60    Calcium 7.8 (L) 8.6 - 10.2 mg/dL    Total Protein 5.9 (L) 6.4 - 8.1 g/dL    Alb 3.8 (L) 3.9 - 4.9 g/dL    Total Bilirubin 0.2 0.0 - 1.2 mg/dL    Alkaline Phosphatase 43 40 - 130 U/L    ALT 26 0 - 33 U/L    AST 26 0 - 35 U/L    Globulin 2.1 (L) 2.3 - 3.5 g/dL   Magnesium    Collection Time: 03/09/18  5:13 AM   Result Value Ref Range    Magnesium 2.1 1.7 - 2.3 mg/dL   Sedimentation Rate    Collection Time: 03/09/18  5:13 AM   Result Value Ref Range    Sed Rate 13 0 - 30 mm   High sensitivity CRP    Collection Time: 03/09/18  5:13 AM   Result Value Ref Range    CRP High Sensitivity 0.7 0.0 - 5.0 mg/L   Troponin    Collection Time: 03/09/18  1:58 PM   Result Value Ref Range    Troponin <0.010 0.000 - 0.010 ng/mL       CXR:  NO ACUTE ACTIVE CARDIOPULMONARY PROCESS     ECG:                 SR,  80 rate            Ranjan Jett MD ,[address] [place] [address] Cardiology

## 2018-03-10 LAB
ALBUMIN SERPL-MCNC: 3.5 G/DL (ref 3.9–4.9)
ALP BLD-CCNC: 37 U/L (ref 40–130)
ALT SERPL-CCNC: 77 U/L (ref 0–33)
ANION GAP SERPL CALCULATED.3IONS-SCNC: 13 MEQ/L (ref 7–13)
AST SERPL-CCNC: 61 U/L (ref 0–35)
BILIRUB SERPL-MCNC: 0.1 MG/DL (ref 0–1.2)
BUN BLDV-MCNC: 34 MG/DL (ref 8–23)
C-REACTIVE PROTEIN, HIGH SENSITIVITY: 0.3 MG/L (ref 0–5)
CALCIUM SERPL-MCNC: 7.7 MG/DL (ref 8.6–10.2)
CHLORIDE BLD-SCNC: 101 MEQ/L (ref 98–107)
CO2: 25 MEQ/L (ref 22–29)
CREAT SERPL-MCNC: 1.12 MG/DL (ref 0.5–0.9)
GFR AFRICAN AMERICAN: 56.2
GFR NON-AFRICAN AMERICAN: 46.4
GLOBULIN: 2 G/DL (ref 2.3–3.5)
GLUCOSE BLD-MCNC: 141 MG/DL (ref 74–109)
HCT VFR BLD CALC: 28.4 % (ref 37–47)
HEMOGLOBIN: 9.3 G/DL (ref 12–16)
MCH RBC QN AUTO: 31.2 PG (ref 27–31.3)
MCHC RBC AUTO-ENTMCNC: 32.9 % (ref 33–37)
MCV RBC AUTO: 94.7 FL (ref 82–100)
PDW BLD-RTO: 13.5 % (ref 11.5–14.5)
PLATELET # BLD: 216 K/UL (ref 130–400)
POTASSIUM SERPL-SCNC: 5 MEQ/L (ref 3.5–5.1)
RBC # BLD: 2.99 M/UL (ref 4.2–5.4)
SODIUM BLD-SCNC: 139 MEQ/L (ref 132–144)
TOTAL PROTEIN: 5.5 G/DL (ref 6.4–8.1)
WBC # BLD: 15.2 K/UL (ref 4.8–10.8)

## 2018-03-10 PROCEDURE — 6370000000 HC RX 637 (ALT 250 FOR IP): Performed by: NURSE PRACTITIONER

## 2018-03-10 PROCEDURE — 6360000002 HC RX W HCPCS: Performed by: INTERNAL MEDICINE

## 2018-03-10 PROCEDURE — 93005 ELECTROCARDIOGRAM TRACING: CPT

## 2018-03-10 PROCEDURE — 6370000000 HC RX 637 (ALT 250 FOR IP): Performed by: INTERNAL MEDICINE

## 2018-03-10 PROCEDURE — 86141 C-REACTIVE PROTEIN HS: CPT

## 2018-03-10 PROCEDURE — 94667 MNPJ CHEST WALL 1ST: CPT

## 2018-03-10 PROCEDURE — 36415 COLL VENOUS BLD VENIPUNCTURE: CPT

## 2018-03-10 PROCEDURE — 2580000003 HC RX 258: Performed by: INTERNAL MEDICINE

## 2018-03-10 PROCEDURE — 94640 AIRWAY INHALATION TREATMENT: CPT

## 2018-03-10 PROCEDURE — 2700000000 HC OXYGEN THERAPY PER DAY

## 2018-03-10 PROCEDURE — 99232 SBSQ HOSP IP/OBS MODERATE 35: CPT | Performed by: INTERNAL MEDICINE

## 2018-03-10 PROCEDURE — 85027 COMPLETE CBC AUTOMATED: CPT

## 2018-03-10 PROCEDURE — 80053 COMPREHEN METABOLIC PANEL: CPT

## 2018-03-10 PROCEDURE — 2060000000 HC ICU INTERMEDIATE R&B

## 2018-03-10 RX ORDER — ALPRAZOLAM 0.5 MG/1
0.5 TABLET ORAL ONCE
Status: COMPLETED | OUTPATIENT
Start: 2018-03-10 | End: 2018-03-10

## 2018-03-10 RX ORDER — GUAIFENESIN 600 MG/1
600 TABLET, EXTENDED RELEASE ORAL 2 TIMES DAILY
Status: DISCONTINUED | OUTPATIENT
Start: 2018-03-10 | End: 2018-03-21 | Stop reason: HOSPADM

## 2018-03-10 RX ADMIN — ALPRAZOLAM 0.5 MG: 0.5 TABLET ORAL at 03:29

## 2018-03-10 RX ADMIN — IPRATROPIUM BROMIDE AND ALBUTEROL SULFATE 1 AMPULE: .5; 3 SOLUTION RESPIRATORY (INHALATION) at 19:55

## 2018-03-10 RX ADMIN — ALPRAZOLAM 0.5 MG: 0.5 TABLET ORAL at 23:47

## 2018-03-10 RX ADMIN — GUAIFENESIN 600 MG: 600 TABLET, EXTENDED RELEASE ORAL at 23:47

## 2018-03-10 RX ADMIN — Medication 10 ML: at 23:50

## 2018-03-10 RX ADMIN — DOCUSATE SODIUM 100 MG: 100 CAPSULE, LIQUID FILLED ORAL at 23:46

## 2018-03-10 RX ADMIN — AZITHROMYCIN MONOHYDRATE 500 MG: 500 INJECTION, POWDER, LYOPHILIZED, FOR SOLUTION INTRAVENOUS at 23:47

## 2018-03-10 RX ADMIN — ALBUTEROL SULFATE 2.5 MG: 2.5 SOLUTION RESPIRATORY (INHALATION) at 17:32

## 2018-03-10 RX ADMIN — ALBUTEROL SULFATE 2.5 MG: 2.5 SOLUTION RESPIRATORY (INHALATION) at 05:33

## 2018-03-10 RX ADMIN — NITROGLYCERIN 1 INCH: 20 OINTMENT TOPICAL at 07:06

## 2018-03-10 RX ADMIN — IPRATROPIUM BROMIDE AND ALBUTEROL SULFATE 1 AMPULE: .5; 3 SOLUTION RESPIRATORY (INHALATION) at 07:48

## 2018-03-10 RX ADMIN — SODIUM CHLORIDE: 9 INJECTION, SOLUTION INTRAVENOUS at 06:00

## 2018-03-10 RX ADMIN — MONTELUKAST SODIUM 10 MG: 10 TABLET, FILM COATED ORAL at 23:46

## 2018-03-10 RX ADMIN — METHYLPREDNISOLONE SODIUM SUCCINATE 40 MG: 40 INJECTION, POWDER, FOR SOLUTION INTRAMUSCULAR; INTRAVENOUS at 07:03

## 2018-03-10 RX ADMIN — ENOXAPARIN SODIUM 40 MG: 40 INJECTION SUBCUTANEOUS at 23:48

## 2018-03-10 RX ADMIN — SIMVASTATIN 20 MG: 20 TABLET, FILM COATED ORAL at 23:47

## 2018-03-10 RX ADMIN — METHYLPREDNISOLONE SODIUM SUCCINATE 40 MG: 40 INJECTION, POWDER, FOR SOLUTION INTRAMUSCULAR; INTRAVENOUS at 15:09

## 2018-03-10 RX ADMIN — HYDROCHLOROTHIAZIDE 25 MG: 25 TABLET ORAL at 08:20

## 2018-03-10 RX ADMIN — VERAPAMIL HYDROCHLORIDE 180 MG: 180 TABLET, FILM COATED, EXTENDED RELEASE ORAL at 23:46

## 2018-03-10 RX ADMIN — VALSARTAN 160 MG: 160 TABLET ORAL at 08:21

## 2018-03-10 RX ADMIN — DOCUSATE SODIUM 100 MG: 100 CAPSULE, LIQUID FILLED ORAL at 08:22

## 2018-03-10 RX ADMIN — IPRATROPIUM BROMIDE AND ALBUTEROL SULFATE 1 AMPULE: .5; 3 SOLUTION RESPIRATORY (INHALATION) at 12:54

## 2018-03-10 RX ADMIN — ASPIRIN 81 MG 81 MG: 81 TABLET ORAL at 08:20

## 2018-03-10 RX ADMIN — METHYLPREDNISOLONE SODIUM SUCCINATE 40 MG: 40 INJECTION, POWDER, FOR SOLUTION INTRAMUSCULAR; INTRAVENOUS at 23:46

## 2018-03-10 ASSESSMENT — PAIN SCALES - GENERAL
PAINLEVEL_OUTOF10: 0

## 2018-03-10 NOTE — PROGRESS NOTES
tenderness, skin normal, No bruits. LUNGS:  Increased work of breathing, decreased air exchange. Bilateral wheezing  CARDIOVASCULAR:  Normal apical impulse, regular rate and rhythm, normal S1 and S2,  2/6 Systolic murmur noted. ABDOMEN:  Obese, normal bowel sounds, soft, non-distended, non-tender, no masses palpated, no hepatosplenomegally  EXTREMETIES: No edema, Pulses Strong Thruout. No ulcers. NEUROLOGIC:  Awake, alert, oriented to name, place and time. Following all commands and moving all extremties.   SKIN:  no bruising or bleeding, normal skin color, texture, turgor and no rashes     Data:        LABS:      Recent Results (from the past 24 hour(s))   Troponin    Collection Time: 03/09/18  1:58 PM   Result Value Ref Range    Troponin <0.010 0.000 - 0.010 ng/mL   EKG 12 Lead    Collection Time: 03/10/18  2:01 AM   Result Value Ref Range    Ventricular Rate 66 BPM    Atrial Rate 66 BPM    P-R Interval 184 ms    QRS Duration 86 ms    Q-T Interval 422 ms    QTc Calculation (Bazett) 442 ms    P Axis 68 degrees    R Axis 53 degrees    T Axis 53 degrees   CBC    Collection Time: 03/10/18  5:56 AM   Result Value Ref Range    WBC 15.2 (H) 4.8 - 10.8 K/uL    RBC 2.99 (L) 4.20 - 5.40 M/uL    Hemoglobin 9.3 (L) 12.0 - 16.0 g/dL    Hematocrit 28.4 (L) 37.0 - 47.0 %    MCV 94.7 82.0 - 100.0 fL    MCH 31.2 27.0 - 31.3 pg    MCHC 32.9 (L) 33.0 - 37.0 %    RDW 13.5 11.5 - 14.5 %    Platelets 391 505 - 820 K/uL   Comprehensive Metabolic Panel    Collection Time: 03/10/18  5:56 AM   Result Value Ref Range    Sodium 139 132 - 144 mEq/L    Potassium 5.0 3.5 - 5.1 mEq/L    Chloride 101 98 - 107 mEq/L    CO2 25 22 - 29 mEq/L    Anion Gap 13 7 - 13 mEq/L    Glucose 141 (H) 74 - 109 mg/dL    BUN 34 (H) 8 - 23 mg/dL    CREATININE 1.12 (H) 0.50 - 0.90 mg/dL    GFR Non-African American 46.4 (L) >60    GFR  56.2 (L) >60    Calcium 7.7 (L) 8.6 - 10.2 mg/dL    Total Protein 5.5 (L) 6.4 - 8.1 g/dL    Alb 3.5 (L) 3.9 - 4.9

## 2018-03-11 LAB
ALBUMIN SERPL-MCNC: 3.9 G/DL (ref 3.9–4.9)
ALP BLD-CCNC: 38 U/L (ref 40–130)
ALT SERPL-CCNC: 78 U/L (ref 0–33)
ANION GAP SERPL CALCULATED.3IONS-SCNC: 16 MEQ/L (ref 7–13)
AST SERPL-CCNC: 34 U/L (ref 0–35)
BILIRUB SERPL-MCNC: 0.2 MG/DL (ref 0–1.2)
BUN BLDV-MCNC: 30 MG/DL (ref 8–23)
C-REACTIVE PROTEIN, HIGH SENSITIVITY: 0.4 MG/L (ref 0–5)
CALCIUM SERPL-MCNC: 8 MG/DL (ref 8.6–10.2)
CHLORIDE BLD-SCNC: 95 MEQ/L (ref 98–107)
CO2: 26 MEQ/L (ref 22–29)
CREAT SERPL-MCNC: 1.17 MG/DL (ref 0.5–0.9)
GFR AFRICAN AMERICAN: 53.4
GFR NON-AFRICAN AMERICAN: 44.1
GLOBULIN: 1.9 G/DL (ref 2.3–3.5)
GLUCOSE BLD-MCNC: 137 MG/DL (ref 74–109)
HCT VFR BLD CALC: 30.6 % (ref 37–47)
HEMOGLOBIN: 10.2 G/DL (ref 12–16)
MCH RBC QN AUTO: 31.5 PG (ref 27–31.3)
MCHC RBC AUTO-ENTMCNC: 33.4 % (ref 33–37)
MCV RBC AUTO: 94.3 FL (ref 82–100)
PDW BLD-RTO: 13.8 % (ref 11.5–14.5)
PLATELET # BLD: 216 K/UL (ref 130–400)
POTASSIUM SERPL-SCNC: 4.5 MEQ/L (ref 3.5–5.1)
RBC # BLD: 3.24 M/UL (ref 4.2–5.4)
SEDIMENTATION RATE, ERYTHROCYTE: 13 MM (ref 0–30)
SODIUM BLD-SCNC: 137 MEQ/L (ref 132–144)
TOTAL PROTEIN: 5.8 G/DL (ref 6.4–8.1)
WBC # BLD: 14 K/UL (ref 4.8–10.8)

## 2018-03-11 PROCEDURE — 82330 ASSAY OF CALCIUM: CPT

## 2018-03-11 PROCEDURE — 6370000000 HC RX 637 (ALT 250 FOR IP): Performed by: INTERNAL MEDICINE

## 2018-03-11 PROCEDURE — 82803 BLOOD GASES ANY COMBINATION: CPT

## 2018-03-11 PROCEDURE — 2580000003 HC RX 258: Performed by: INTERNAL MEDICINE

## 2018-03-11 PROCEDURE — 80053 COMPREHEN METABOLIC PANEL: CPT

## 2018-03-11 PROCEDURE — 6360000002 HC RX W HCPCS: Performed by: INTERNAL MEDICINE

## 2018-03-11 PROCEDURE — 94668 MNPJ CHEST WALL SBSQ: CPT

## 2018-03-11 PROCEDURE — 86141 C-REACTIVE PROTEIN HS: CPT

## 2018-03-11 PROCEDURE — 85014 HEMATOCRIT: CPT

## 2018-03-11 PROCEDURE — 85652 RBC SED RATE AUTOMATED: CPT

## 2018-03-11 PROCEDURE — 83605 ASSAY OF LACTIC ACID: CPT

## 2018-03-11 PROCEDURE — 99232 SBSQ HOSP IP/OBS MODERATE 35: CPT | Performed by: INTERNAL MEDICINE

## 2018-03-11 PROCEDURE — 84132 ASSAY OF SERUM POTASSIUM: CPT

## 2018-03-11 PROCEDURE — 36415 COLL VENOUS BLD VENIPUNCTURE: CPT

## 2018-03-11 PROCEDURE — 82565 ASSAY OF CREATININE: CPT

## 2018-03-11 PROCEDURE — 36600 WITHDRAWAL OF ARTERIAL BLOOD: CPT

## 2018-03-11 PROCEDURE — 94640 AIRWAY INHALATION TREATMENT: CPT

## 2018-03-11 PROCEDURE — 85027 COMPLETE CBC AUTOMATED: CPT

## 2018-03-11 PROCEDURE — 82435 ASSAY OF BLOOD CHLORIDE: CPT

## 2018-03-11 PROCEDURE — 2060000000 HC ICU INTERMEDIATE R&B

## 2018-03-11 PROCEDURE — 6370000000 HC RX 637 (ALT 250 FOR IP): Performed by: NURSE PRACTITIONER

## 2018-03-11 PROCEDURE — 2700000000 HC OXYGEN THERAPY PER DAY

## 2018-03-11 RX ORDER — IPRATROPIUM BROMIDE AND ALBUTEROL SULFATE 2.5; .5 MG/3ML; MG/3ML
1 SOLUTION RESPIRATORY (INHALATION)
Status: DISCONTINUED | OUTPATIENT
Start: 2018-03-11 | End: 2018-03-14

## 2018-03-11 RX ADMIN — IPRATROPIUM BROMIDE AND ALBUTEROL SULFATE 1 AMPULE: .5; 3 SOLUTION RESPIRATORY (INHALATION) at 07:25

## 2018-03-11 RX ADMIN — METHYLPREDNISOLONE SODIUM SUCCINATE 40 MG: 40 INJECTION, POWDER, FOR SOLUTION INTRAMUSCULAR; INTRAVENOUS at 08:38

## 2018-03-11 RX ADMIN — ASPIRIN 81 MG 81 MG: 81 TABLET ORAL at 08:44

## 2018-03-11 RX ADMIN — HYDROCHLOROTHIAZIDE 25 MG: 25 TABLET ORAL at 08:43

## 2018-03-11 RX ADMIN — CEFTRIAXONE 1 G: 1 INJECTION, POWDER, FOR SOLUTION INTRAMUSCULAR; INTRAVENOUS at 12:13

## 2018-03-11 RX ADMIN — SIMVASTATIN 20 MG: 20 TABLET, FILM COATED ORAL at 21:00

## 2018-03-11 RX ADMIN — GUAIFENESIN 600 MG: 600 TABLET, EXTENDED RELEASE ORAL at 21:00

## 2018-03-11 RX ADMIN — Medication 10 ML: at 21:00

## 2018-03-11 RX ADMIN — DOCUSATE SODIUM 100 MG: 100 CAPSULE, LIQUID FILLED ORAL at 08:43

## 2018-03-11 RX ADMIN — VERAPAMIL HYDROCHLORIDE 180 MG: 180 TABLET, FILM COATED, EXTENDED RELEASE ORAL at 08:43

## 2018-03-11 RX ADMIN — DOCUSATE SODIUM 100 MG: 100 CAPSULE, LIQUID FILLED ORAL at 21:00

## 2018-03-11 RX ADMIN — VERAPAMIL HYDROCHLORIDE 180 MG: 180 TABLET, FILM COATED, EXTENDED RELEASE ORAL at 21:00

## 2018-03-11 RX ADMIN — IPRATROPIUM BROMIDE AND ALBUTEROL SULFATE 1 AMPULE: .5; 3 SOLUTION RESPIRATORY (INHALATION) at 15:49

## 2018-03-11 RX ADMIN — IPRATROPIUM BROMIDE AND ALBUTEROL SULFATE 1 AMPULE: .5; 3 SOLUTION RESPIRATORY (INHALATION) at 11:02

## 2018-03-11 RX ADMIN — Medication 10 ML: at 08:40

## 2018-03-11 RX ADMIN — MONTELUKAST SODIUM 10 MG: 10 TABLET, FILM COATED ORAL at 21:00

## 2018-03-11 RX ADMIN — ALBUTEROL SULFATE 2.5 MG: 2.5 SOLUTION RESPIRATORY (INHALATION) at 00:03

## 2018-03-11 RX ADMIN — GUAIFENESIN 600 MG: 600 TABLET, EXTENDED RELEASE ORAL at 08:43

## 2018-03-11 RX ADMIN — VALSARTAN 160 MG: 160 TABLET ORAL at 08:44

## 2018-03-11 RX ADMIN — ENOXAPARIN SODIUM 40 MG: 40 INJECTION SUBCUTANEOUS at 21:00

## 2018-03-11 RX ADMIN — METHYLPREDNISOLONE SODIUM SUCCINATE 40 MG: 40 INJECTION, POWDER, FOR SOLUTION INTRAMUSCULAR; INTRAVENOUS at 16:17

## 2018-03-11 RX ADMIN — IPRATROPIUM BROMIDE AND ALBUTEROL SULFATE 1 AMPULE: .5; 3 SOLUTION RESPIRATORY (INHALATION) at 19:48

## 2018-03-11 ASSESSMENT — PAIN SCALES - GENERAL: PAINLEVEL_OUTOF10: 0

## 2018-03-11 NOTE — PROGRESS NOTES
Physician Progress Note    3/11/2018   10:10 AM    Name:  Mercedes Nice  MRN:    [id number]      Day: 4     Admit Date: 3/7/2018  2:12 PM  PCP: Megan Kincaid MD    Code Status:  Full Code    Subjective:     C/o cough, not able to bring up secretions. Has dyspnea. Started on humidified high flow NC yesterday by pulm, getting VEST tx, mucinex and using acapella and IS. Has on and off chest pain. Physical Examination:      Vitals:  BP (!) 140/54   Pulse 70   Temp 97.9 °F (36.6 °C) (Oral)   Resp 18   Ht 5' 3\" (1.6 m)   Wt 179 lb 9.6 oz (81.5 kg)   SpO2 100%   BMI 31.81 kg/m²   Temp (24hrs), Av °F (36.7 °C), Min:97.9 °F (36.6 °C), Max:98.1 °F (36.7 °C)      General appearance: alert, cooperative and no distress  Mental Status: oriented to person, place and time and normal affect  Lungs: exp wheezing bilaterally, normal effort  Heart: regular rate and rhythm, no murmur  Abdomen: soft, nontender, nondistended, bowel sounds present, no masses  Extremities: no edema, redness, tenderness in the calves  Skin: no gross lesions, rashes  Psych: appear anxious     Data:     Labs:  Recent Labs      03/10/18   0556  18   0559   WBC  15.2*  14.0*   HGB  9.3*  10.2*   PLT  216  216     Recent Labs      03/10/18   0556  18   0559   NA  139  137   K  5.0  4.5   CL  101  95*   CO2  25  26   BUN  34*  30*   CREATININE  1.12*  1.17*   GLUCOSE  141*  137*     Recent Labs      03/10/18   0556  18   0559   AST  61*  34   ALT  77*  78*   BILITOT  0.1  0.2   ALKPHOS  37*  38*       Assessment and Plan:          Acute Hospital issues:    # acute on chronic hypoxic respiratory failure 2/2 COPD exac-  On high flow NC, ABG reviewed 7., no changes needed ). Resume nebs, steroids, and abx as ordered. Added Rocephin. Resume mucinex, IS, acapella and VEST therapy. Increased nebs to q4' [facility].       # chest pain- typical symptoms- s/p cardiac nuclear stress 3/9/18  test negative for ischemia, asa, tele, cardiology on

## 2018-03-11 NOTE — PROGRESS NOTES
tenderness, skin normal, No bruits. LUNGS:  Increased work of breathing, decreased air exchange. Bilateral wheezing  CARDIOVASCULAR:  Normal apical impulse, regular rate and rhythm, normal S1 and S2,  2/6 Systolic murmur noted. ABDOMEN:  Obese, normal bowel sounds, soft, non-distended, non-tender, no masses palpated, no hepatosplenomegally  EXTREMETIES: No edema, Pulses Strong Thruout. No ulcers. NEUROLOGIC:  Awake, alert, oriented to name, place and time. Following all commands and moving all extremties.   SKIN:  no bruising or bleeding, normal skin color, texture, turgor and no rashes     Data:        LABS:      Recent Results (from the past 24 hour(s))   CBC    Collection Time: 03/11/18  5:59 AM   Result Value Ref Range    WBC 14.0 (H) 4.8 - 10.8 K/uL    RBC 3.24 (L) 4.20 - 5.40 M/uL    Hemoglobin 10.2 (L) 12.0 - 16.0 g/dL    Hematocrit 30.6 (L) 37.0 - 47.0 %    MCV 94.3 82.0 - 100.0 fL    MCH 31.5 (H) 27.0 - 31.3 pg    MCHC 33.4 33.0 - 37.0 %    RDW 13.8 11.5 - 14.5 %    Platelets 817 709 - 142 K/uL   Comprehensive Metabolic Panel    Collection Time: 03/11/18  5:59 AM   Result Value Ref Range    Sodium 137 132 - 144 mEq/L    Potassium 4.5 3.5 - 5.1 mEq/L    Chloride 95 (L) 98 - 107 mEq/L    CO2 26 22 - 29 mEq/L    Anion Gap 16 (H) 7 - 13 mEq/L    Glucose 137 (H) 74 - 109 mg/dL    BUN 30 (H) 8 - 23 mg/dL    CREATININE 1.17 (H) 0.50 - 0.90 mg/dL    GFR Non-African American 44.1 (L) >60    GFR  53.4 (L) >60    Calcium 8.0 (L) 8.6 - 10.2 mg/dL    Total Protein 5.8 (L) 6.4 - 8.1 g/dL    Alb 3.9 3.9 - 4.9 g/dL    Total Bilirubin 0.2 0.0 - 1.2 mg/dL    Alkaline Phosphatase 38 (L) 40 - 130 U/L    ALT 78 (H) 0 - 33 U/L    AST 34 0 - 35 U/L    Globulin 1.9 (L) 2.3 - 3.5 g/dL   Sedimentation Rate    Collection Time: 03/11/18  5:59 AM   Result Value Ref Range    Sed Rate 13 0 - 30 mm   High sensitivity CRP    Collection Time: 03/11/18  5:59 AM   Result Value Ref Range    CRP High Sensitivity 0.4 0.0 - 5.0 mg/L       CXR:  NO ACUTE ACTIVE CARDIOPULMONARY PROCESS     ECG:                 SR,  60 rate    LEXISCAN MYOVIEW:  Negative for Ischemia  LVEF 86%          Allen Lara MD ,[address] [place] [address] Cardiology

## 2018-03-11 NOTE — PROGRESS NOTES
141*  137*   CALCIUM  7.7*  8.0*   PROT  5.5*  5.8*   LABALBU  3.5*  3.9   BILITOT  0.1  0.2   ALKPHOS  37*  38*   AST  61*  34   ALT  77*  78*   LABGLOM  46.4*  44.1*   GFRAA  56.2*  53.4*   GLOB  2.0*  1.9*       No results for input(s): PHART, EBT0BHP, PO2ART, KCS2JKB, BEART, E4NYMNEV in the last 72 hours. O2 Device: High flow nasal cannula  O2 Flow Rate (L/min): 40 L/min    DIET GENERAL;     MEDICATIONS during current hospitalization:    Continuous Infusions:    Scheduled Meds:   ipratropium-albuterol  1 ampule Inhalation Q4H WA    cefTRIAXone (ROCEPHIN) IV  1 g Intravenous Q24H    enoxaparin  40 mg Subcutaneous Daily    guaiFENesin  600 mg Oral BID    verapamil  180 mg Oral BID    docusate sodium  100 mg Oral BID    simvastatin  20 mg Oral Nightly    sodium chloride flush  10 mL Intravenous 2 times per day    methylPREDNISolone  40 mg Intravenous Q8H    azithromycin  500 mg Intravenous Q24H    aspirin  81 mg Oral Daily    hydrochlorothiazide  25 mg Oral Daily    montelukast  10 mg Oral Daily    valsartan  160 mg Oral Daily       PRN Meds:sodium chloride flush, albuterol, nitroGLYCERIN, morphine, sodium chloride flush, acetaminophen, magnesium hydroxide, ondansetron    Radiology  Xr Chest Standard (2 Vw)    Result Date: 3/9/2018  Chest 2 views. HISTORY: Chest pain. Shortness of breath. FINDINGS: Comparison, March 7, 2018. Osteopenia. Anterior osteophytes lower thoracic spine. Cardiac pericardial silhouette is normal. Pulmonary vasculature normal. Lungs clear. No acute cardiopulmonary disease. Xr Chest Portable    Result Date: 3/7/2018  EXAMINATION: CHEST PORTABLE VIEW  CLINICAL HISTORY: Short of breath COMPARISONS: July 1, 2017  FINDINGS: Single  views of the chest is submitted. The cardiac silhouette is of normal size configuration. The mediastinum is unremarkable. Pulmonary vascular unremarkable. Right sided trachea. No focal infiltrates. No Pneumothoraces.

## 2018-03-12 ENCOUNTER — APPOINTMENT (OUTPATIENT)
Dept: CT IMAGING | Age: 83
DRG: 190 | End: 2018-03-12
Payer: MEDICARE

## 2018-03-12 LAB
BLOOD CULTURE, ROUTINE: NORMAL
CULTURE, BLOOD 2: NORMAL

## 2018-03-12 PROCEDURE — 94668 MNPJ CHEST WALL SBSQ: CPT

## 2018-03-12 PROCEDURE — 6360000004 HC RX CONTRAST MEDICATION: Performed by: INTERNAL MEDICINE

## 2018-03-12 PROCEDURE — 6370000000 HC RX 637 (ALT 250 FOR IP): Performed by: INTERNAL MEDICINE

## 2018-03-12 PROCEDURE — 94640 AIRWAY INHALATION TREATMENT: CPT

## 2018-03-12 PROCEDURE — 2060000000 HC ICU INTERMEDIATE R&B

## 2018-03-12 PROCEDURE — 71275 CT ANGIOGRAPHY CHEST: CPT

## 2018-03-12 PROCEDURE — 2700000000 HC OXYGEN THERAPY PER DAY

## 2018-03-12 PROCEDURE — 6360000002 HC RX W HCPCS: Performed by: INTERNAL MEDICINE

## 2018-03-12 PROCEDURE — 2580000003 HC RX 258: Performed by: INTERNAL MEDICINE

## 2018-03-12 PROCEDURE — 99232 SBSQ HOSP IP/OBS MODERATE 35: CPT | Performed by: INTERNAL MEDICINE

## 2018-03-12 PROCEDURE — 6370000000 HC RX 637 (ALT 250 FOR IP): Performed by: NURSE PRACTITIONER

## 2018-03-12 RX ORDER — THEOPHYLLINE ANHYDROUS 100 MG
100 TABLET, EXTENDED RELEASE 12 HR ORAL EVERY 12 HOURS SCHEDULED
Status: DISCONTINUED | OUTPATIENT
Start: 2018-03-12 | End: 2018-03-13

## 2018-03-12 RX ORDER — SODIUM CHLORIDE 9 MG/ML
INJECTION, SOLUTION INTRAVENOUS
Status: DISPENSED
Start: 2018-03-12 | End: 2018-03-12

## 2018-03-12 RX ORDER — ALPRAZOLAM 0.5 MG/1
0.5 TABLET ORAL NIGHTLY PRN
Status: DISCONTINUED | OUTPATIENT
Start: 2018-03-12 | End: 2018-03-21 | Stop reason: HOSPADM

## 2018-03-12 RX ADMIN — VALSARTAN 160 MG: 160 TABLET ORAL at 09:32

## 2018-03-12 RX ADMIN — Medication 10 ML: at 21:50

## 2018-03-12 RX ADMIN — ENOXAPARIN SODIUM 40 MG: 40 INJECTION SUBCUTANEOUS at 21:49

## 2018-03-12 RX ADMIN — CEFTRIAXONE 1 G: 1 INJECTION, POWDER, FOR SOLUTION INTRAMUSCULAR; INTRAVENOUS at 09:32

## 2018-03-12 RX ADMIN — VERAPAMIL HYDROCHLORIDE 180 MG: 180 TABLET, FILM COATED, EXTENDED RELEASE ORAL at 21:49

## 2018-03-12 RX ADMIN — DOCUSATE SODIUM 100 MG: 100 CAPSULE, LIQUID FILLED ORAL at 09:32

## 2018-03-12 RX ADMIN — GUAIFENESIN 600 MG: 600 TABLET, EXTENDED RELEASE ORAL at 09:32

## 2018-03-12 RX ADMIN — HYDROCHLOROTHIAZIDE 25 MG: 25 TABLET ORAL at 09:32

## 2018-03-12 RX ADMIN — SIMVASTATIN 20 MG: 20 TABLET, FILM COATED ORAL at 21:49

## 2018-03-12 RX ADMIN — ASPIRIN 81 MG 81 MG: 81 TABLET ORAL at 09:32

## 2018-03-12 RX ADMIN — METHYLPREDNISOLONE SODIUM SUCCINATE 40 MG: 40 INJECTION, POWDER, FOR SOLUTION INTRAMUSCULAR; INTRAVENOUS at 00:00

## 2018-03-12 RX ADMIN — IOPAMIDOL 100 ML: 755 INJECTION, SOLUTION INTRAVENOUS at 21:02

## 2018-03-12 RX ADMIN — METHYLPREDNISOLONE SODIUM SUCCINATE 40 MG: 40 INJECTION, POWDER, FOR SOLUTION INTRAMUSCULAR; INTRAVENOUS at 16:47

## 2018-03-12 RX ADMIN — VERAPAMIL HYDROCHLORIDE 180 MG: 180 TABLET, FILM COATED, EXTENDED RELEASE ORAL at 09:32

## 2018-03-12 RX ADMIN — MONTELUKAST SODIUM 10 MG: 10 TABLET, FILM COATED ORAL at 21:49

## 2018-03-12 RX ADMIN — GUAIFENESIN 600 MG: 600 TABLET, EXTENDED RELEASE ORAL at 21:49

## 2018-03-12 RX ADMIN — IPRATROPIUM BROMIDE AND ALBUTEROL SULFATE 1 AMPULE: .5; 3 SOLUTION RESPIRATORY (INHALATION) at 16:29

## 2018-03-12 RX ADMIN — ALBUTEROL SULFATE 2.5 MG: 2.5 SOLUTION RESPIRATORY (INHALATION) at 03:08

## 2018-03-12 RX ADMIN — METHYLPREDNISOLONE SODIUM SUCCINATE 40 MG: 40 INJECTION, POWDER, FOR SOLUTION INTRAMUSCULAR; INTRAVENOUS at 00:20

## 2018-03-12 RX ADMIN — IPRATROPIUM BROMIDE AND ALBUTEROL SULFATE 1 AMPULE: .5; 3 SOLUTION RESPIRATORY (INHALATION) at 08:38

## 2018-03-12 RX ADMIN — ACETAMINOPHEN 650 MG: 325 TABLET, FILM COATED ORAL at 02:49

## 2018-03-12 RX ADMIN — IPRATROPIUM BROMIDE AND ALBUTEROL SULFATE 1 AMPULE: .5; 3 SOLUTION RESPIRATORY (INHALATION) at 20:25

## 2018-03-12 RX ADMIN — AZITHROMYCIN MONOHYDRATE 500 MG: 500 INJECTION, POWDER, LYOPHILIZED, FOR SOLUTION INTRAVENOUS at 00:20

## 2018-03-12 RX ADMIN — IPRATROPIUM BROMIDE AND ALBUTEROL SULFATE 1 AMPULE: .5; 3 SOLUTION RESPIRATORY (INHALATION) at 12:28

## 2018-03-12 RX ADMIN — ALPRAZOLAM 0.5 MG: 0.5 TABLET ORAL at 23:55

## 2018-03-12 ASSESSMENT — PAIN SCALES - GENERAL
PAINLEVEL_OUTOF10: 0
PAINLEVEL_OUTOF10: 3
PAINLEVEL_OUTOF10: 0

## 2018-03-12 NOTE — PROGRESS NOTES
Department of Internal Medicine  Progress Note      SUBJECTIVE: Patient continues to remain on high flow oxygen. Complains of chest heaviness. No other complains  No acute events overnight.        ROS:  All 12 systems reviewed and negative except mentioned in HPI and Assessment and plan    MEDICATIONS:  Current Facility-Administered Medications   Medication Dose Route Frequency Provider Last Rate Last Dose    sodium chloride 0.9 % infusion             ipratropium-albuterol (DUONEB) nebulizer solution 1 ampule  1 ampule Inhalation Q4H WA Rickey Recinos, DO   1 ampule at 03/12/18 3452    cefTRIAXone (ROCEPHIN) 1 g IVPB in 50 mL D5W minibag  1 g Intravenous Q24H Rickey Recinos,  mL/hr at 03/12/18 0932 1 g at 03/12/18 0932    enoxaparin (LOVENOX) injection 40 mg  40 mg Subcutaneous Daily Rickey Recinos, DO   40 mg at 03/11/18 2100    guaiFENesin (MUCINEX) extended release tablet 600 mg  600 mg Oral BID Jia Gore MD   600 mg at 03/12/18 0932    sodium chloride flush 0.9 % injection 10 mL  10 mL Intravenous PRN Fredis Deutsch MD   10 mL at 03/09/18 0950    verapamil (CALAN SR) extended release tablet 180 mg  180 mg Oral BID Fredis Deutsch MD   180 mg at 03/12/18 0932    docusate sodium (COLACE) capsule 100 mg  100 mg Oral BID Fredis Deutsch MD   100 mg at 03/12/18 0932    simvastatin (ZOCOR) tablet 20 mg  20 mg Oral Nightly Fredis Deutsch MD   20 mg at 03/11/18 2100    albuterol (PROVENTIL) nebulizer solution 2.5 mg  2.5 mg Nebulization Q2H PRN Jhon Farris MD   2.5 mg at 03/12/18 0308    nitroGLYCERIN (NITROSTAT) SL tablet 0.4 mg  0.4 mg Sublingual Q5 Min PRN Santosh Montgomery MD   0.4 mg at 03/09/18 0958    morphine injection 2 mg  2 mg Intravenous Q4H PRN Santosh Montgomery MD        sodium chloride flush 0.9 % injection 10 mL  10 mL Intravenous 2 times per day Rickey Recinos DO   10 mL at 03/11/18 2100    sodium chloride flush 0.9 % injection 10 mL  10 mL

## 2018-03-12 NOTE — PROGRESS NOTES
infiltrates. No Pneumothoraces. NO ACUTE ACTIVE CARDIOPULMONARY PROCESS        IMPRESSION AND SUGGESTION:  1.  Acute COPD exacerbation secondary to bronchitis, complicated by retained airway secretions with inability to mobilize. I will continue with aggressive pulmonary toileting, guaifenesin, heated humidified high flow oxygen. 2.  Acute respiratory distress ,  stable at rest but severely dyspneic with exertion  3.  Chest pain rule out etiology, likely noncardiac  4.  Hypertension  5.  Anxiety, associated with acute illness and severe dyspnea    Continue present treatment plan will request a CT chest today to rule out PE if negative will consider bronchoscopy for retained airway secretions. Continue bronchodilator. IV steroid. Add theophylline 200 mg daily. Cortez Vega MD.[facility].

## 2018-03-12 NOTE — CARE COORDINATION
PT ON HIGH FLOW O2, MAY NEED BRONCH. PT STILL VERY SOB. UNABLE TO WORK WITH PT/OT. WILL FOLLOW FOR D/C PLANNING, ? SNF VS HHC WITH PALL CARE.

## 2018-03-13 LAB
BASE EXCESS ARTERIAL: 5 (ref -3–3)
CALCIUM IONIZED: 1.1 MMOL/L (ref 1.12–1.32)
GFR AFRICAN AMERICAN: 40
GFR NON-AFRICAN AMERICAN: 33
GLUCOSE BLD-MCNC: 155 MG/DL (ref 60–115)
HCO3 ARTERIAL: 30.2 MMOL/L (ref 21–29)
HEMOGLOBIN: 10.9 GM/DL (ref 12–16)
LACTATE: 2.28 MMOL/L (ref 0.4–2)
O2 SAT, ARTERIAL: 97 % (ref 93–100)
PCO2 ARTERIAL: 53 MM HG (ref 35–45)
PERFORMED ON: ABNORMAL
PH ARTERIAL: 7.37 (ref 7.35–7.45)
PO2 ARTERIAL: 93 MM HG (ref 75–108)
POC CHLORIDE: 95 MEQ/L (ref 99–110)
POC CREATININE: 1.5 MG/DL (ref 0.6–1.2)
POC FIO2: 34
POC HEMATOCRIT: 32 % (ref 36–48)
POC POTASSIUM: 3.9 MEQ/L (ref 3.5–5.1)
POC SAMPLE TYPE: ABNORMAL
POC SODIUM: 135 MEQ/L (ref 136–145)
TCO2 ARTERIAL: 32 (ref 22–29)

## 2018-03-13 PROCEDURE — 2060000000 HC ICU INTERMEDIATE R&B

## 2018-03-13 PROCEDURE — 94640 AIRWAY INHALATION TREATMENT: CPT

## 2018-03-13 PROCEDURE — 6360000002 HC RX W HCPCS: Performed by: INTERNAL MEDICINE

## 2018-03-13 PROCEDURE — 97166 OT EVAL MOD COMPLEX 45 MIN: CPT

## 2018-03-13 PROCEDURE — G8978 MOBILITY CURRENT STATUS: HCPCS

## 2018-03-13 PROCEDURE — 94667 MNPJ CHEST WALL 1ST: CPT

## 2018-03-13 PROCEDURE — G8979 MOBILITY GOAL STATUS: HCPCS

## 2018-03-13 PROCEDURE — 6370000000 HC RX 637 (ALT 250 FOR IP): Performed by: INTERNAL MEDICINE

## 2018-03-13 PROCEDURE — 99233 SBSQ HOSP IP/OBS HIGH 50: CPT | Performed by: INTERNAL MEDICINE

## 2018-03-13 PROCEDURE — 2700000000 HC OXYGEN THERAPY PER DAY

## 2018-03-13 PROCEDURE — 97162 PT EVAL MOD COMPLEX 30 MIN: CPT

## 2018-03-13 PROCEDURE — G8989 SELF CARE D/C STATUS: HCPCS

## 2018-03-13 PROCEDURE — G8987 SELF CARE CURRENT STATUS: HCPCS

## 2018-03-13 PROCEDURE — G8988 SELF CARE GOAL STATUS: HCPCS

## 2018-03-13 PROCEDURE — 94760 N-INVAS EAR/PLS OXIMETRY 1: CPT

## 2018-03-13 PROCEDURE — 97163 PT EVAL HIGH COMPLEX 45 MIN: CPT

## 2018-03-13 PROCEDURE — 2580000003 HC RX 258: Performed by: INTERNAL MEDICINE

## 2018-03-13 PROCEDURE — 6370000000 HC RX 637 (ALT 250 FOR IP): Performed by: NURSE PRACTITIONER

## 2018-03-13 RX ORDER — ALPRAZOLAM 0.25 MG/1
0.25 TABLET ORAL 3 TIMES DAILY PRN
Status: DISCONTINUED | OUTPATIENT
Start: 2018-03-13 | End: 2018-03-21 | Stop reason: HOSPADM

## 2018-03-13 RX ADMIN — VERAPAMIL HYDROCHLORIDE 180 MG: 180 TABLET, FILM COATED, EXTENDED RELEASE ORAL at 08:50

## 2018-03-13 RX ADMIN — GUAIFENESIN 600 MG: 600 TABLET, EXTENDED RELEASE ORAL at 08:49

## 2018-03-13 RX ADMIN — METHYLPREDNISOLONE SODIUM SUCCINATE 40 MG: 40 INJECTION, POWDER, FOR SOLUTION INTRAMUSCULAR; INTRAVENOUS at 08:50

## 2018-03-13 RX ADMIN — METHYLPREDNISOLONE SODIUM SUCCINATE 40 MG: 40 INJECTION, POWDER, FOR SOLUTION INTRAMUSCULAR; INTRAVENOUS at 00:51

## 2018-03-13 RX ADMIN — METHYLPREDNISOLONE SODIUM SUCCINATE 40 MG: 40 INJECTION, POWDER, FOR SOLUTION INTRAMUSCULAR; INTRAVENOUS at 15:37

## 2018-03-13 RX ADMIN — HYDROCHLOROTHIAZIDE 25 MG: 25 TABLET ORAL at 08:50

## 2018-03-13 RX ADMIN — ASPIRIN 81 MG 81 MG: 81 TABLET ORAL at 08:49

## 2018-03-13 RX ADMIN — IPRATROPIUM BROMIDE AND ALBUTEROL SULFATE 1 AMPULE: .5; 3 SOLUTION RESPIRATORY (INHALATION) at 12:52

## 2018-03-13 RX ADMIN — VERAPAMIL HYDROCHLORIDE 180 MG: 180 TABLET, FILM COATED, EXTENDED RELEASE ORAL at 21:39

## 2018-03-13 RX ADMIN — SIMVASTATIN 20 MG: 20 TABLET, FILM COATED ORAL at 21:39

## 2018-03-13 RX ADMIN — THEOPHYLLINE ANHYDROUS 100 MG: 100 CAPSULE, EXTENDED RELEASE ORAL at 00:51

## 2018-03-13 RX ADMIN — MONTELUKAST SODIUM 10 MG: 10 TABLET, FILM COATED ORAL at 21:39

## 2018-03-13 RX ADMIN — IPRATROPIUM BROMIDE AND ALBUTEROL SULFATE 1 AMPULE: .5; 3 SOLUTION RESPIRATORY (INHALATION) at 09:28

## 2018-03-13 RX ADMIN — VALSARTAN 160 MG: 160 TABLET ORAL at 08:50

## 2018-03-13 RX ADMIN — IPRATROPIUM BROMIDE AND ALBUTEROL SULFATE 1 AMPULE: .5; 3 SOLUTION RESPIRATORY (INHALATION) at 19:27

## 2018-03-13 RX ADMIN — THEOPHYLLINE ANHYDROUS 200 MG: 100 CAPSULE, EXTENDED RELEASE ORAL at 21:39

## 2018-03-13 RX ADMIN — ALPRAZOLAM 0.25 MG: 0.25 TABLET ORAL at 17:39

## 2018-03-13 RX ADMIN — Medication 10 ML: at 21:45

## 2018-03-13 RX ADMIN — METHYLPREDNISOLONE SODIUM SUCCINATE 40 MG: 40 INJECTION, POWDER, FOR SOLUTION INTRAMUSCULAR; INTRAVENOUS at 23:17

## 2018-03-13 RX ADMIN — Medication 10 ML: at 08:50

## 2018-03-13 RX ADMIN — GUAIFENESIN 600 MG: 600 TABLET, EXTENDED RELEASE ORAL at 21:39

## 2018-03-13 RX ADMIN — THEOPHYLLINE ANHYDROUS 100 MG: 100 CAPSULE, EXTENDED RELEASE ORAL at 08:50

## 2018-03-13 RX ADMIN — ENOXAPARIN SODIUM 30 MG: 100 INJECTION SUBCUTANEOUS at 21:39

## 2018-03-13 RX ADMIN — IPRATROPIUM BROMIDE AND ALBUTEROL SULFATE 1 AMPULE: .5; 3 SOLUTION RESPIRATORY (INHALATION) at 15:47

## 2018-03-13 RX ADMIN — ALBUTEROL SULFATE 2.5 MG: 2.5 SOLUTION RESPIRATORY (INHALATION) at 05:34

## 2018-03-13 RX ADMIN — CEFTRIAXONE 1 G: 1 INJECTION, POWDER, FOR SOLUTION INTRAMUSCULAR; INTRAVENOUS at 10:45

## 2018-03-13 RX ADMIN — ALPRAZOLAM 0.5 MG: 0.5 TABLET ORAL at 23:15

## 2018-03-13 ASSESSMENT — PAIN SCALES - GENERAL
PAINLEVEL_OUTOF10: 0

## 2018-03-13 NOTE — CARE COORDINATION
LSW met with the pt, her  and her daughter. Pt is not sure about her discharge plan. Pt's  states they need to think about it. LSW discussed rehab/SNF/HHC.   Electronically signed by ALAN Hui on 3/13/18 at 4:56 PM

## 2018-03-13 NOTE — PROGRESS NOTES
deficits  Assistance / Modification: Min A    Prognosis:  [] Good   [x]Fair   [] Poor     Barriers to Improvement:  Tolerance    Recommended DME:  [] W/W   [] Jaron Steel   [] Rollator   [] W/C   [] Yulisa Dueñas  [] Shower Chair   []Dressing AD []  [facility]  [] Other:    Plan: ,  N/A    G-Codes:  OT G-codes  Functional Limitation: Self care  Self Care Current Status (): At least 1 percent but less than 20 percent impaired, limited or restricted  Self Care Goal Status (): At least 1 percent but less than 20 percent impaired, limited or restricted  Self Care Discharge Status ():  At least 1 percent but less than 20 percent impaired, limited or restricted    Time in:  1000  Time out:  1020  Timed treatment minutes:  20  Total treatment time/minutes:  20    Electronically signed by:    Natalia Diaz OT  5/29/7686, 10:59 AM Electronically signed by MUKESH An/L on 2/44/03 at 11:11 AM

## 2018-03-13 NOTE — PROGRESS NOTES
sided trachea. No focal infiltrates. No Pneumothoraces. NO ACUTE ACTIVE CARDIOPULMONARY PROCESS        IMPRESSION AND SUGGESTION:  1.  Acute COPD exacerbation secondary to bronchitis, complicated by retained airway secretions with inability to mobilize. I will continue with aggressive pulmonary toileting, guaifenesin, heated humidified high flow oxygen. 2.  Acute respiratory distress ,  stable at rest but severely dyspneic with exertion  3.  Chest pain rule out etiology, likely noncardiac  4.  Hypertension  5.  Anxiety, associated with acute illness and severe dyspnea    Patient had a CT chest which was showing the emphysema, no infiltration no PE. Continue bronchodilator. IV steroid. Change theophylline 200 mg twice a day and the level in a.m. due to anxiety and Xanax 0.25 mg 3 times a day when necessary. Patient is having significant bronchospasm I explained about bronchoscopy and risk . option and benefit. She is risk for respiratory failure and may require intubation due to persistent bronchospasm. She does not wants to have a bronchoscopy done at this time. Carolin Holland MD.[facility].

## 2018-03-13 NOTE — PROGRESS NOTES
PLT  216   --        Recent Labs      03/11/18   0559  03/11/18   0702   NA  137   --    K  4.5   --    CL  95*   --    CO2  26   --    BUN  30*   --    CREATININE  1.17*  1.5*   GLUCOSE  137*   --    CALCIUM  8.0*   --        No results for input(s): MG in the last 72 hours. ASSESSMENT AND PLAN    Active Hospital Problems    Diagnosis Date Noted    [facility].1[facility] 03/07/2018     Cute on chronic hypoxic reps failure: secondary to COPD exacerbation. On high flow oxygen, on breathing treatments, Acapella, IS and VEST therapy. Pulm following. Continue rocephin  - Chest pain: probably due to chronic coughing.  Cardiac cause rule out by recent stress test. Cardio signes off  - Anxiety: wants xanax to help relax and sleep at night  - HTN: controlled  - DVT prophylaxis: Lovenox      Yoselin Bertrand MD  Pager : [phone]

## 2018-03-13 NOTE — PROGRESS NOTES
activities. REQUIRES PT FOLLOW UP: Yes      PLAN OF CARE:  Plan  Times per week: 3-6  Current Treatment Recommendations: Strengthening, Neuromuscular Re-education, Home Exercise Program, Safety Education & Training, Patient/Caregiver Education & Training, Endurance Training, Functional Mobility Training, Transfer Training, Gait Training, Equipment Evaluation, Education, & procurement  Safety Devices  Type of devices: All fall risk precautions in place    G-Code:  PT G-Codes  Functional Assessment Tool Used: clinical judgement   Functional Limitation: Mobility: Walking and moving around  Mobility: Walking and Moving Around Current Status (): At least 60 percent but less than 80 percent impaired, limited or restricted  Mobility: Walking and Moving Around Goal Status ():  At least 40 percent but less than 60 percent impaired, limited or restricted    Goals:  Short term goals  Short term goal 1: Pt will be indep with HEP  Short term goal 2: Pt will amb 22' with indep  Short term goal 3: Pt will complete 5' seated ther ex/activity without a rest break  Short term goal 4: Pt will complete 2' standing activity without a rest break with 0-1 UE support    Therapy Time:   Individual   Time In 1000   Time Out 1020   Minutes 20   Timed Code Treatment Minutes: 0 Minutes       Angel Sierra PT, 03/13/18 at 11:12 AM

## 2018-03-13 NOTE — PROGRESS NOTES
CARDIOLOGY [address] [facility] PROGRESS NOTE         3/13/2018      Sharan Crawford    [id number]  1934    Rounding MD: Braulio Castaneda MD ,[facility]    Primary Cardiologist: Joselyn Fatima MD     Requesting Physician:  Marissa Ornelas DO     Reason for Initial Consult:  Tachycardia, Chest Pain     SUBJECTIVE:     Patient resting well, less sob and wheezing, but no CP. BP and HR improved with RX. On Verapamil. Stress test and LV Function normal.     Home Medications reviewed with nurse. Cardiac and general ROS otherwise negative and unchanged.     Assessment:     1. Chest Pain, typical , atypical, Resolved  2. Tachycardia, now resolved with medications resumed and changed. 3. COPD Exacerbation, improving   4. Negative Cardiac Cath 2009,  5. Negative Myoview Stress 2015 and 3/18  6. Normal LV Function, LVEF 60%  7. HTN  8. HLP  9. Anemia, Mild  10. Renal Insufficiency  11. DJD  12. Lumbar Spinal Stenosis , Post Laminectomy  13. Past Tobacco use  14. Overweight  15. Negative CTA CHEST FOR PE, 3/18     Plan:     1. Cardiac Supportive Care  2. Continue Current medications. 3. Telemetry monitoring  4. Pulmonary treatment   5. Once Stable, OK ot DC home From CV point if ok with others. 6. Will Follow with you. 7. See Orders           HISTORY OF PRESENT ILLNESS:      Sharan Crawford is a pleasant 80 y.o. female who presented to [facility] emergency room with worsening shortness of breath and chest pain symptoms. Patient was found to have tachycardia and COPD exacerbation. She has the above-noted past and current history. Cardiology was asked to see regarding treatment and evaluation. .     Patient Follows with Dr Paty Morillo.     Patient History and Records, EMR reviewed. Patient Interviewed and examined.     She states that she's been doing well overall. But she does have worsening shortness of breath she does wear oxygen at home.   She at night has had chest

## 2018-03-14 PROBLEM — N17.9 AKI (ACUTE KIDNEY INJURY) (HCC): Status: ACTIVE | Noted: 2018-03-14

## 2018-03-14 LAB
ANION GAP SERPL CALCULATED.3IONS-SCNC: 11 MEQ/L (ref 7–13)
ANISOCYTOSIS: ABNORMAL
BANDED NEUTROPHILS RELATIVE PERCENT: 2 % (ref 5–11)
BASOPHILS ABSOLUTE: 0 K/UL (ref 0–0.2)
BASOPHILS RELATIVE PERCENT: 0.1 %
BUN BLDV-MCNC: 30 MG/DL (ref 8–23)
CALCIUM SERPL-MCNC: 7.6 MG/DL (ref 8.6–10.2)
CHLORIDE BLD-SCNC: 89 MEQ/L (ref 98–107)
CO2: 30 MEQ/L (ref 22–29)
CREAT SERPL-MCNC: 1.07 MG/DL (ref 0.5–0.9)
EOSINOPHILS ABSOLUTE: 0 K/UL (ref 0–0.7)
EOSINOPHILS RELATIVE PERCENT: 0 %
GFR AFRICAN AMERICAN: 59.2
GFR NON-AFRICAN AMERICAN: 48.9
GLUCOSE BLD-MCNC: 163 MG/DL (ref 74–109)
HCT VFR BLD CALC: 32 % (ref 37–47)
HEMOGLOBIN: 10.7 G/DL (ref 12–16)
HYPOCHROMIA: ABNORMAL
LYMPHOCYTES ABSOLUTE: 1.2 K/UL (ref 1–4.8)
LYMPHOCYTES RELATIVE PERCENT: 7 %
MACROCYTES: 0
MCH RBC QN AUTO: 31.3 PG (ref 27–31.3)
MCHC RBC AUTO-ENTMCNC: 33.6 % (ref 33–37)
MCV RBC AUTO: 93.2 FL (ref 82–100)
MICROCYTES: 0
MONOCYTES ABSOLUTE: 0.3 K/UL (ref 0.2–0.8)
MONOCYTES RELATIVE PERCENT: 1.9 %
MYELOCYTE PERCENT: 1 %
NEUTROPHILS ABSOLUTE: 15.7 K/UL (ref 1.4–6.5)
NEUTROPHILS RELATIVE PERCENT: 89 %
PDW BLD-RTO: 13.5 % (ref 11.5–14.5)
PLATELET # BLD: 263 K/UL (ref 130–400)
PLATELET SLIDE REVIEW: ADEQUATE
POIKILOCYTES: 0
POLYCHROMASIA: ABNORMAL
POTASSIUM SERPL-SCNC: 4.2 MEQ/L (ref 3.5–5.1)
RBC # BLD: 3.43 M/UL (ref 4.2–5.4)
SLIDE REVIEW: ABNORMAL
SODIUM BLD-SCNC: 130 MEQ/L (ref 132–144)
THEOPHYLLINE LEVEL: 6.4 UG/ML (ref 10–20)
WBC # BLD: 17.1 K/UL (ref 4.8–10.8)

## 2018-03-14 PROCEDURE — 94640 AIRWAY INHALATION TREATMENT: CPT

## 2018-03-14 PROCEDURE — 6370000000 HC RX 637 (ALT 250 FOR IP): Performed by: NURSE PRACTITIONER

## 2018-03-14 PROCEDURE — 94664 DEMO&/EVAL PT USE INHALER: CPT

## 2018-03-14 PROCEDURE — 94668 MNPJ CHEST WALL SBSQ: CPT

## 2018-03-14 PROCEDURE — 6370000000 HC RX 637 (ALT 250 FOR IP): Performed by: INTERNAL MEDICINE

## 2018-03-14 PROCEDURE — 6360000002 HC RX W HCPCS: Performed by: INTERNAL MEDICINE

## 2018-03-14 PROCEDURE — 2580000003 HC RX 258: Performed by: INTERNAL MEDICINE

## 2018-03-14 PROCEDURE — 2060000000 HC ICU INTERMEDIATE R&B

## 2018-03-14 PROCEDURE — 80048 BASIC METABOLIC PNL TOTAL CA: CPT

## 2018-03-14 PROCEDURE — 99233 SBSQ HOSP IP/OBS HIGH 50: CPT | Performed by: INTERNAL MEDICINE

## 2018-03-14 PROCEDURE — 36415 COLL VENOUS BLD VENIPUNCTURE: CPT

## 2018-03-14 PROCEDURE — 93010 ELECTROCARDIOGRAM REPORT: CPT | Performed by: INTERNAL MEDICINE

## 2018-03-14 PROCEDURE — 94760 N-INVAS EAR/PLS OXIMETRY 1: CPT

## 2018-03-14 PROCEDURE — 2700000000 HC OXYGEN THERAPY PER DAY

## 2018-03-14 PROCEDURE — 85025 COMPLETE CBC W/AUTO DIFF WBC: CPT

## 2018-03-14 PROCEDURE — 80198 ASSAY OF THEOPHYLLINE: CPT

## 2018-03-14 RX ORDER — BENZONATATE 100 MG/1
100 CAPSULE ORAL 3 TIMES DAILY PRN
Status: DISCONTINUED | OUTPATIENT
Start: 2018-03-14 | End: 2018-03-21 | Stop reason: HOSPADM

## 2018-03-14 RX ORDER — IPRATROPIUM BROMIDE AND ALBUTEROL SULFATE 2.5; .5 MG/3ML; MG/3ML
1 SOLUTION RESPIRATORY (INHALATION) 3 TIMES DAILY
Status: DISCONTINUED | OUTPATIENT
Start: 2018-03-14 | End: 2018-03-19

## 2018-03-14 RX ADMIN — METHYLPREDNISOLONE SODIUM SUCCINATE 40 MG: 40 INJECTION, POWDER, FOR SOLUTION INTRAMUSCULAR; INTRAVENOUS at 09:29

## 2018-03-14 RX ADMIN — VERAPAMIL HYDROCHLORIDE 180 MG: 180 TABLET, FILM COATED, EXTENDED RELEASE ORAL at 20:13

## 2018-03-14 RX ADMIN — HYDROCHLOROTHIAZIDE 25 MG: 25 TABLET ORAL at 09:29

## 2018-03-14 RX ADMIN — VERAPAMIL HYDROCHLORIDE 180 MG: 180 TABLET, FILM COATED, EXTENDED RELEASE ORAL at 09:29

## 2018-03-14 RX ADMIN — ASPIRIN 81 MG 81 MG: 81 TABLET ORAL at 09:29

## 2018-03-14 RX ADMIN — BENZONATATE 100 MG: 100 CAPSULE ORAL at 12:26

## 2018-03-14 RX ADMIN — BENZOCAINE AND MENTHOL 1 LOZENGE: 15; 3.6 LOZENGE ORAL at 20:58

## 2018-03-14 RX ADMIN — ENOXAPARIN SODIUM 30 MG: 100 INJECTION SUBCUTANEOUS at 20:13

## 2018-03-14 RX ADMIN — Medication 10 ML: at 09:30

## 2018-03-14 RX ADMIN — VALSARTAN 160 MG: 160 TABLET ORAL at 09:29

## 2018-03-14 RX ADMIN — SIMVASTATIN 20 MG: 20 TABLET, FILM COATED ORAL at 20:13

## 2018-03-14 RX ADMIN — IPRATROPIUM BROMIDE AND ALBUTEROL SULFATE 1 AMPULE: .5; 3 SOLUTION RESPIRATORY (INHALATION) at 12:38

## 2018-03-14 RX ADMIN — THEOPHYLLINE ANHYDROUS 200 MG: 100 CAPSULE, EXTENDED RELEASE ORAL at 09:29

## 2018-03-14 RX ADMIN — GUAIFENESIN 600 MG: 600 TABLET, EXTENDED RELEASE ORAL at 20:13

## 2018-03-14 RX ADMIN — MONTELUKAST SODIUM 10 MG: 10 TABLET, FILM COATED ORAL at 20:13

## 2018-03-14 RX ADMIN — METHYLPREDNISOLONE SODIUM SUCCINATE 40 MG: 40 INJECTION, POWDER, FOR SOLUTION INTRAMUSCULAR; INTRAVENOUS at 16:16

## 2018-03-14 RX ADMIN — ALPRAZOLAM 0.5 MG: 0.5 TABLET ORAL at 23:23

## 2018-03-14 RX ADMIN — BENZOCAINE AND MENTHOL 1 LOZENGE: 15; 3.6 LOZENGE ORAL at 18:44

## 2018-03-14 RX ADMIN — ALBUTEROL SULFATE 2.5 MG: 2.5 SOLUTION RESPIRATORY (INHALATION) at 23:17

## 2018-03-14 RX ADMIN — GUAIFENESIN 600 MG: 600 TABLET, EXTENDED RELEASE ORAL at 09:29

## 2018-03-14 RX ADMIN — Medication 10 ML: at 20:13

## 2018-03-14 RX ADMIN — BENZOCAINE AND MENTHOL 1 LOZENGE: 15; 3.6 LOZENGE ORAL at 16:16

## 2018-03-14 RX ADMIN — IPRATROPIUM BROMIDE AND ALBUTEROL SULFATE 1 AMPULE: .5; 3 SOLUTION RESPIRATORY (INHALATION) at 07:37

## 2018-03-14 RX ADMIN — METHYLPREDNISOLONE SODIUM SUCCINATE 40 MG: 40 INJECTION, POWDER, FOR SOLUTION INTRAMUSCULAR; INTRAVENOUS at 23:20

## 2018-03-14 RX ADMIN — IPRATROPIUM BROMIDE AND ALBUTEROL SULFATE 1 AMPULE: .5; 3 SOLUTION RESPIRATORY (INHALATION) at 19:07

## 2018-03-14 RX ADMIN — CEFTRIAXONE 1 G: 1 INJECTION, POWDER, FOR SOLUTION INTRAMUSCULAR; INTRAVENOUS at 12:25

## 2018-03-14 ASSESSMENT — PAIN SCALES - GENERAL
PAINLEVEL_OUTOF10: 0

## 2018-03-14 NOTE — PROGRESS NOTES
Disorder  (acute or chronic)  [x]   Severe or Chronic w/ Exacerbation  []     Surgical Status No [x]   Surgeries     General []   Surgery Lower []   Abdominal Thoracic or []   Upper Abdominal Thoracic with  PulmonaryDisorder  []     Chest X-ray Clear/Not  Ordered     [x]  Chronic Changes  Results Pending  []  Infiltrates, atelectasis, pleural effusion, or edema  []  Infiltrates in more than one lobe []  Infiltrate + Atelectasis, &/or pleural effusion  []    Respiratory Pattern Regular,  RR = 12-20 [x]  Increased,  RR = 21-25 []  BECKER, irregular,  or RR = 26-30 []  Decreased FEV1  or RR = 31-35 []  Severe SOB, use  of accessory muscles, or RR ? 35  []    Mental Status Alert, oriented,  Cooperative [x]  Confused but Follows commands []  Lethargic or unable to follow commands []  Obtunded  []  Comatose  []    Breath Sounds Clear to  auscultation  []  Decreased unilaterally or  in bases only []  Decreased  bilaterally  []  Crackles or intermittent wheezes [x]  Wheezes []    Cough Strong, Spontan., & nonproductive [x]  Strong,  spontaneous, &  productive []  Weak,  Nonproductive []  Weak, productive or  with wheezes []  No spontaneous  cough or may require suctioning []    Level of Activity Ambulatory []  Ambulatory w/ Assist  [x]  Non-ambulatory []  Paraplegic []  Quadriplegic []    Total    Score:___7____     Triage Score:___4_____      Tri       Triage:     1. (>20) Freq: Q3    2. (16-20) Freq: Q4   3. (11-15) Freq: QID & Albuterol Q2 PRN    4. (6-10) Freq: TID & Albuterol Q2 PRN    5. (0-5) Freq Q4prn

## 2018-03-14 NOTE — PROGRESS NOTES
bruits. LUNGS:  Increased work of breathing, decreased air exchange. Bilateral wheezing  CARDIOVASCULAR:  Normal apical impulse, regular rate and rhythm, normal S1 and S2,  2/6 Systolic murmur noted. ABDOMEN:  Obese, normal bowel sounds, soft, non-distended, non-tender, no masses palpated, no hepatosplenomegally  EXTREMETIES: No edema, Pulses Strong Thruout. No ulcers. NEUROLOGIC:  Awake, alert, oriented to name, place and time. Following all commands and moving all extremties.   SKIN:  no bruising or bleeding, normal skin color, texture, turgor and no rashes     Data:        LABS:      Recent Results (from the past 24 hour(s))   Basic Metabolic Panel    Collection Time: 03/14/18  5:30 AM   Result Value Ref Range    Sodium 130 (L) 132 - 144 mEq/L    Potassium 4.2 3.5 - 5.1 mEq/L    Chloride 89 (L) 98 - 107 mEq/L    CO2 30 (H) 22 - 29 mEq/L    Anion Gap 11 7 - 13 mEq/L    Glucose 163 (H) 74 - 109 mg/dL    BUN 30 (H) 8 - 23 mg/dL    CREATININE 1.07 (H) 0.50 - 0.90 mg/dL    GFR Non-African American 48.9 (L) >60    GFR  59.2 (L) >60    Calcium 7.6 (L) 8.6 - 10.2 mg/dL   CBC Auto Differential    Collection Time: 03/14/18  5:30 AM   Result Value Ref Range    WBC 17.1 (H) 4.8 - 10.8 K/uL    RBC 3.43 (L) 4.20 - 5.40 M/uL    Hemoglobin 10.7 (L) 12.0 - 16.0 g/dL    Hematocrit 32.0 (L) 37.0 - 47.0 %    MCV 93.2 82.0 - 100.0 fL    MCH 31.3 27.0 - 31.3 pg    MCHC 33.6 33.0 - 37.0 %    RDW 13.5 11.5 - 14.5 %    Platelets 308 515 - 594 K/uL    PLATELET SLIDE REVIEW Adequate     SLIDE REVIEW see below     Neutrophils % 89.0 %    Lymphocytes % 7.0 %    Monocytes % 1.9 %    Eosinophils % 0.0 %    Basophils % 0.1 %    Neutrophils # 15.7 (H) 1.4 - 6.5 K/uL    Lymphocytes # 1.2 1.0 - 4.8 K/uL    Monocytes # 0.3 0.2 - 0.8 K/uL    Eosinophils # 0.0 0.0 - 0.7 K/uL    Basophils # 0.0 0.0 - 0.2 K/uL    Bands Relative 2 (L) 5 - 11 %    Myelocytes Relative 1 %    Anisocytosis 1+     Macrocytes 0     Microcytes 0

## 2018-03-14 NOTE — PLAN OF CARE
Problem: Falls - Risk of  Goal: Absence of falls  Outcome: Met This Shift      Problem: Pain:  Goal: Pain level will decrease  Pain level will decrease   Outcome: Met This Shift    Goal: Control of acute pain  Control of acute pain   Outcome: Met This Shift    Goal: Control of chronic pain  Control of chronic pain   Outcome: Met This Shift      Problem: Respiratory Function - Compromised  Goal: Absence of respiratory distress  Absence of respiratory distress     Outcome: Ongoing      Problem: Infection:  Goal: Will remain free from infection  Will remain free from infection   Outcome: Ongoing      Problem: Safety:  Goal: Free from accidental physical injury  Free from accidental physical injury   Outcome: Met This Shift      Problem: Daily Care:  Goal: Daily care needs are met  Daily care needs are met   Outcome: Met This Shift      Problem: Skin Integrity:  Goal: Skin integrity will stabilize  Skin integrity will stabilize   Outcome: Met This Shift      Problem: Discharge Planning:  Goal: Patients continuum of care needs are met  Patients continuum of care needs are met   Outcome: Ongoing      Problem: Mobility - Impaired:  Goal: Mobility will improve  Mobility will improve   Outcome: Ongoing

## 2018-03-15 PROCEDURE — 2580000003 HC RX 258: Performed by: INTERNAL MEDICINE

## 2018-03-15 PROCEDURE — 2700000000 HC OXYGEN THERAPY PER DAY

## 2018-03-15 PROCEDURE — 6370000000 HC RX 637 (ALT 250 FOR IP): Performed by: INTERNAL MEDICINE

## 2018-03-15 PROCEDURE — 99232 SBSQ HOSP IP/OBS MODERATE 35: CPT | Performed by: INTERNAL MEDICINE

## 2018-03-15 PROCEDURE — 94668 MNPJ CHEST WALL SBSQ: CPT

## 2018-03-15 PROCEDURE — 6360000002 HC RX W HCPCS: Performed by: INTERNAL MEDICINE

## 2018-03-15 PROCEDURE — 6370000000 HC RX 637 (ALT 250 FOR IP): Performed by: NURSE PRACTITIONER

## 2018-03-15 PROCEDURE — 2060000000 HC ICU INTERMEDIATE R&B

## 2018-03-15 PROCEDURE — 94640 AIRWAY INHALATION TREATMENT: CPT

## 2018-03-15 RX ADMIN — CEFTRIAXONE 1 G: 1 INJECTION, POWDER, FOR SOLUTION INTRAMUSCULAR; INTRAVENOUS at 10:01

## 2018-03-15 RX ADMIN — Medication 10 ML: at 10:01

## 2018-03-15 RX ADMIN — BENZOCAINE AND MENTHOL 1 LOZENGE: 15; 3.6 LOZENGE ORAL at 23:19

## 2018-03-15 RX ADMIN — IPRATROPIUM BROMIDE AND ALBUTEROL SULFATE 1 AMPULE: .5; 3 SOLUTION RESPIRATORY (INHALATION) at 20:02

## 2018-03-15 RX ADMIN — IPRATROPIUM BROMIDE AND ALBUTEROL SULFATE 1 AMPULE: .5; 3 SOLUTION RESPIRATORY (INHALATION) at 07:34

## 2018-03-15 RX ADMIN — MONTELUKAST SODIUM 10 MG: 10 TABLET, FILM COATED ORAL at 21:38

## 2018-03-15 RX ADMIN — ALBUTEROL SULFATE 2.5 MG: 2.5 SOLUTION RESPIRATORY (INHALATION) at 16:30

## 2018-03-15 RX ADMIN — METHYLPREDNISOLONE SODIUM SUCCINATE 40 MG: 40 INJECTION, POWDER, FOR SOLUTION INTRAMUSCULAR; INTRAVENOUS at 16:45

## 2018-03-15 RX ADMIN — HYDROCHLOROTHIAZIDE 25 MG: 25 TABLET ORAL at 10:00

## 2018-03-15 RX ADMIN — IPRATROPIUM BROMIDE AND ALBUTEROL SULFATE 1 AMPULE: .5; 3 SOLUTION RESPIRATORY (INHALATION) at 13:22

## 2018-03-15 RX ADMIN — SIMVASTATIN 20 MG: 20 TABLET, FILM COATED ORAL at 21:39

## 2018-03-15 RX ADMIN — GUAIFENESIN 600 MG: 600 TABLET, EXTENDED RELEASE ORAL at 10:01

## 2018-03-15 RX ADMIN — BENZOCAINE AND MENTHOL 1 LOZENGE: 15; 3.6 LOZENGE ORAL at 04:40

## 2018-03-15 RX ADMIN — VERAPAMIL HYDROCHLORIDE 180 MG: 180 TABLET, FILM COATED, EXTENDED RELEASE ORAL at 21:38

## 2018-03-15 RX ADMIN — Medication 10 ML: at 21:38

## 2018-03-15 RX ADMIN — VERAPAMIL HYDROCHLORIDE 180 MG: 180 TABLET, FILM COATED, EXTENDED RELEASE ORAL at 10:01

## 2018-03-15 RX ADMIN — METHYLPREDNISOLONE SODIUM SUCCINATE 40 MG: 40 INJECTION, POWDER, FOR SOLUTION INTRAMUSCULAR; INTRAVENOUS at 10:01

## 2018-03-15 RX ADMIN — ENOXAPARIN SODIUM 40 MG: 40 INJECTION SUBCUTANEOUS at 21:38

## 2018-03-15 RX ADMIN — ALPRAZOLAM 0.5 MG: 0.5 TABLET ORAL at 23:20

## 2018-03-15 RX ADMIN — THEOPHYLLINE ANHYDROUS 400 MG: 300 CAPSULE, EXTENDED RELEASE ORAL at 10:01

## 2018-03-15 RX ADMIN — ASPIRIN 81 MG 81 MG: 81 TABLET ORAL at 10:00

## 2018-03-15 RX ADMIN — GUAIFENESIN 600 MG: 600 TABLET, EXTENDED RELEASE ORAL at 21:38

## 2018-03-15 RX ADMIN — METHYLPREDNISOLONE SODIUM SUCCINATE 40 MG: 40 INJECTION, POWDER, FOR SOLUTION INTRAMUSCULAR; INTRAVENOUS at 23:20

## 2018-03-15 RX ADMIN — VALSARTAN 160 MG: 160 TABLET ORAL at 10:00

## 2018-03-15 ASSESSMENT — PAIN SCALES - GENERAL: PAINLEVEL_OUTOF10: 0

## 2018-03-15 NOTE — PROGRESS NOTES
continue with aggressive pulmonary toileting, guaifenesin, heated humidified, vest therapy. 2.  Acute respiratory distress ,  stable at rest but severely dyspneic with exertion  3.  Chest pain rule out etiology, likely noncardiac  4.  Hypertension  5.  Anxiety, associated with acute illness and severe dyspnea    Patient is having wheezing mostly in upper airways and recommends to check if any vocal cord dysfunction. Consult with ENT today. Continue bronchodilator therapy and pulmonary toilet. Continue theophylline and Xanax. She does not wants to have a bronchoscopy done at this time. Continue present treatment plan. Discussed with SKYLAR Stephen MD.[facility].

## 2018-03-15 NOTE — FLOWSHEET NOTE
AM assessment completed. Patient sitting on the side of the bed at this time with no complaints. Denies any chest pain. NSR on the monitor. No edema noted. Pedal pulses palpable. She is very short of breath with any exertion. Lungs remains diminished with expiratory wheezes bilaterally. Moist non-productive cough noted. SATS 97% on 4L NC. She is A/Ox3 and up in the room with a stand-by assist. Denies any pain with elimination. Skin remains warm, dry and intact. Bruising noted to bilateral upper extremities. #22g SL to right lower forearm, flushed and patent. #20g SL to right upper forearm, flushed and patent. Vital signs stable and AM medications provided. Call light remains in reach.

## 2018-03-15 NOTE — CONSULTS
laryngoscopy;  however, she declined. I would recommend a CT scan of the sinuses to make  sure that there is no _____ sinus infection causing some of the  exacerbation issue. Thank you Dr. Kimberly De La Cruz for this consult.       Billy Mcnair MD    D: 03/15/2018 12:32:46       T: 03/15/2018 13:33:47     [email or web address]  Job#: [id number]     Doc#: [id number]    CC:  Tenny Prude MD Gwenetta Litten MD

## 2018-03-16 ENCOUNTER — APPOINTMENT (OUTPATIENT)
Dept: CT IMAGING | Age: 83
DRG: 190 | End: 2018-03-16
Payer: MEDICARE

## 2018-03-16 PROCEDURE — 94760 N-INVAS EAR/PLS OXIMETRY 1: CPT

## 2018-03-16 PROCEDURE — 6370000000 HC RX 637 (ALT 250 FOR IP): Performed by: INTERNAL MEDICINE

## 2018-03-16 PROCEDURE — 2700000000 HC OXYGEN THERAPY PER DAY

## 2018-03-16 PROCEDURE — 2580000003 HC RX 258: Performed by: INTERNAL MEDICINE

## 2018-03-16 PROCEDURE — 6370000000 HC RX 637 (ALT 250 FOR IP): Performed by: NURSE PRACTITIONER

## 2018-03-16 PROCEDURE — 70486 CT MAXILLOFACIAL W/O DYE: CPT

## 2018-03-16 PROCEDURE — 6360000002 HC RX W HCPCS: Performed by: INTERNAL MEDICINE

## 2018-03-16 PROCEDURE — 94668 MNPJ CHEST WALL SBSQ: CPT

## 2018-03-16 PROCEDURE — 94640 AIRWAY INHALATION TREATMENT: CPT

## 2018-03-16 PROCEDURE — APPSS15 APP SPLIT SHARED TIME 0-15 MINUTES: Performed by: PHYSICIAN ASSISTANT

## 2018-03-16 PROCEDURE — 99232 SBSQ HOSP IP/OBS MODERATE 35: CPT | Performed by: INTERNAL MEDICINE

## 2018-03-16 PROCEDURE — 2060000000 HC ICU INTERMEDIATE R&B

## 2018-03-16 RX ORDER — VERAPAMIL HYDROCHLORIDE 240 MG/1
240 TABLET, FILM COATED, EXTENDED RELEASE ORAL 2 TIMES DAILY
Status: DISCONTINUED | OUTPATIENT
Start: 2018-03-16 | End: 2018-03-21 | Stop reason: HOSPADM

## 2018-03-16 RX ADMIN — VALSARTAN 160 MG: 160 TABLET ORAL at 08:31

## 2018-03-16 RX ADMIN — ASPIRIN 81 MG 81 MG: 81 TABLET ORAL at 08:31

## 2018-03-16 RX ADMIN — ALPRAZOLAM 0.5 MG: 0.5 TABLET ORAL at 22:15

## 2018-03-16 RX ADMIN — Medication 10 ML: at 08:32

## 2018-03-16 RX ADMIN — ALBUTEROL SULFATE 2.5 MG: 2.5 SOLUTION RESPIRATORY (INHALATION) at 00:22

## 2018-03-16 RX ADMIN — GUAIFENESIN 600 MG: 600 TABLET, EXTENDED RELEASE ORAL at 19:47

## 2018-03-16 RX ADMIN — IPRATROPIUM BROMIDE AND ALBUTEROL SULFATE 1 AMPULE: .5; 3 SOLUTION RESPIRATORY (INHALATION) at 21:52

## 2018-03-16 RX ADMIN — GUAIFENESIN 600 MG: 600 TABLET, EXTENDED RELEASE ORAL at 08:31

## 2018-03-16 RX ADMIN — THEOPHYLLINE ANHYDROUS 400 MG: 300 CAPSULE, EXTENDED RELEASE ORAL at 08:31

## 2018-03-16 RX ADMIN — BENZOCAINE AND MENTHOL 4 LOZENGE: 15; 3.6 LOZENGE ORAL at 22:15

## 2018-03-16 RX ADMIN — SIMVASTATIN 20 MG: 20 TABLET, FILM COATED ORAL at 19:47

## 2018-03-16 RX ADMIN — IPRATROPIUM BROMIDE AND ALBUTEROL SULFATE 1 AMPULE: .5; 3 SOLUTION RESPIRATORY (INHALATION) at 08:18

## 2018-03-16 RX ADMIN — ALBUTEROL SULFATE 2.5 MG: 2.5 SOLUTION RESPIRATORY (INHALATION) at 17:54

## 2018-03-16 RX ADMIN — METHYLPREDNISOLONE SODIUM SUCCINATE 40 MG: 40 INJECTION, POWDER, FOR SOLUTION INTRAMUSCULAR; INTRAVENOUS at 17:46

## 2018-03-16 RX ADMIN — MONTELUKAST SODIUM 10 MG: 10 TABLET, FILM COATED ORAL at 19:47

## 2018-03-16 RX ADMIN — METHYLPREDNISOLONE SODIUM SUCCINATE 40 MG: 40 INJECTION, POWDER, FOR SOLUTION INTRAMUSCULAR; INTRAVENOUS at 08:31

## 2018-03-16 RX ADMIN — VERAPAMIL HYDROCHLORIDE 180 MG: 180 TABLET, FILM COATED, EXTENDED RELEASE ORAL at 08:31

## 2018-03-16 RX ADMIN — IPRATROPIUM BROMIDE AND ALBUTEROL SULFATE 1 AMPULE: .5; 3 SOLUTION RESPIRATORY (INHALATION) at 12:53

## 2018-03-16 RX ADMIN — Medication 10 ML: at 19:48

## 2018-03-16 RX ADMIN — ENOXAPARIN SODIUM 40 MG: 40 INJECTION SUBCUTANEOUS at 19:47

## 2018-03-16 RX ADMIN — BENZOCAINE AND MENTHOL 4 LOZENGE: 15; 3.6 LOZENGE ORAL at 08:31

## 2018-03-16 RX ADMIN — VERAPAMIL HYDROCHLORIDE 240 MG: 240 TABLET, FILM COATED, EXTENDED RELEASE ORAL at 19:47

## 2018-03-16 RX ADMIN — HYDROCHLOROTHIAZIDE 25 MG: 25 TABLET ORAL at 08:31

## 2018-03-16 RX ADMIN — CEFTRIAXONE 1 G: 1 INJECTION, POWDER, FOR SOLUTION INTRAMUSCULAR; INTRAVENOUS at 11:57

## 2018-03-16 ASSESSMENT — PAIN SCALES - GENERAL: PAINLEVEL_OUTOF10: 0

## 2018-03-16 NOTE — PROGRESS NOTES
Physical Therapy Missed Treatment   Facility/Department: [facility] MED SURG A258/[id number]    NAME: Hakan Reid  Patient Status:   : 1934 (80 y.o.)  MRN: [id number]  Account: [de-identified]  Gender: female        [x] Patient Declines PT Treatment \"I can't do anything with this arthritis and copd\" just let me rest, I know I need to get up but I can't.          [] Patient Unavailable: Will attempt PT Treatment again at earliest convenience.         Electronically signed by Thalia Carrington PTA on 3/16/18 at 9:26 AM

## 2018-03-16 NOTE — PLAN OF CARE
Problem: Falls - Risk of  Goal: Absence of falls  Outcome: Ongoing      Problem: Pain:  Goal: Pain level will decrease  Pain level will decrease   Outcome: Ongoing    Goal: Control of acute pain  Control of acute pain   Outcome: Ongoing    Goal: Control of chronic pain  Control of chronic pain   Outcome: Ongoing      Problem: Respiratory Function - Compromised  Goal: Absence of respiratory distress  Absence of respiratory distress     Outcome: Ongoing      Problem: Infection:  Goal: Will remain free from infection  Will remain free from infection   Outcome: Ongoing      Problem: Safety:  Goal: Free from accidental physical injury  Free from accidental physical injury   Outcome: Ongoing      Problem: Daily Care:  Goal: Daily care needs are met  Daily care needs are met   Outcome: Ongoing      Problem: Skin Integrity:  Goal: Skin integrity will stabilize  Skin integrity will stabilize   Outcome: Ongoing      Problem: Discharge Planning:  Goal: Patients continuum of care needs are met  Patients continuum of care needs are met   Outcome: Ongoing      Problem: Mobility - Impaired:  Goal: Mobility will improve  Mobility will improve   Outcome: Ongoing

## 2018-03-16 NOTE — CARE COORDINATION
Pt remains on 4l nc- awaiting direction from pulmonology- per note of ENT they were recommending ct scan- md made aware- he will look at consult when he rounds- lh

## 2018-03-16 NOTE — PROGRESS NOTES
associated with pounding. She also has had chest discomfort in her chest with ambulation. It times radiates to her left arm up. It's usually short-lived and relieved with inactivity. She's not use nitroglycerin. She's had no lower extremity pedal edema.       Denies LH, Dizziness, TIA or CVA Symptoms. No Orthopnea, Edema or CHF symptoms. No Palpitations. No Syncope. No Fever, Chills or Cold symptoms.   No GI,  or Bleeding complaints.     She's currently asymptomatic other than shortness of breath.     Cardiac and general ROS otherwise negative.     14 System ROS otherwise negative other than noted.         OBJECTIVE:     MEDICATIONS:     Scheduled Meds:   enoxaparin  40 mg Subcutaneous Daily    ipratropium-albuterol  1 ampule Inhalation TID    theophylline  400 mg Oral Daily    cefTRIAXone (ROCEPHIN) IV  1 g Intravenous Q24H    guaiFENesin  600 mg Oral BID    verapamil  180 mg Oral BID    docusate sodium  100 mg Oral BID    simvastatin  20 mg Oral Nightly    sodium chloride flush  10 mL Intravenous 2 times per day    methylPREDNISolone  40 mg Intravenous Q8H    aspirin  81 mg Oral Daily    hydrochlorothiazide  25 mg Oral Daily    montelukast  10 mg Oral Daily    valsartan  160 mg Oral Daily     Continuous Infusions:    PRN Meds:benzonatate, benzocaine-menthol, ALPRAZolam, ALPRAZolam, sodium chloride flush, albuterol, nitroGLYCERIN, morphine, sodium chloride flush, acetaminophen, magnesium hydroxide, ondansetron    PHYSICAL EXAM:    CURRENT VITALS: BP (!) 150/44   Pulse 91   Temp 99.3 °F (37.4 °C) (Oral)   Resp 22   Ht 5' 3\" (1.6 m)   Wt 183 lb 6.4 oz (83.2 kg)   SpO2 99%   BMI 32.49 kg/m²     CONSTITUTIONAL:  awake, alert, cooperative, no apparent distress,   ENT:  Normocephalic, without obvious abnormality, atraumatic, sinuses nontender on palpation, external ears without lesions,  NECK:  Supple, symmetrical, trachea midline, no adenopathy, thyroid symmetric, not enlarged and no tenderness, skin normal, No bruits. LUNGS:  Increased work of breathing, decreased air exchange. Bilateral wheezing  CARDIOVASCULAR:  Normal apical impulse, regular rate and rhythm, normal S1 and S2,  2/6 Systolic murmur noted. ABDOMEN:  Obese, normal bowel sounds, soft, non-distended, non-tender, no masses palpated, no hepatosplenomegally  EXTREMETIES: No edema, Pulses Strong Thruout. No ulcers. NEUROLOGIC:  Awake, alert, oriented to name, place and time. Following all commands and moving all extremties. SKIN:  no bruising or bleeding, normal skin color, texture, turgor and no rashes     Data:        LABS:      No results found for this or any previous visit (from the past 24 hour(s)). CTA CHEST:  1.  NO EVIDENCE OF CENTRAL OR SEGMENTAL PULMONARY EMBOLISM. 2.  MILD TO MODERATE EMPHYSEMATOUS DISEASE. 3.  SMALL HIATAL HERNIA.   4.  PARTIALLY VISUALIZED PERINEPHRIC STRANDING LEFT KIDNEY AND PARTIALLY VISUALIZED PROBABLE RENAL CYST.    CXR:  NO ACUTE ACTIVE CARDIOPULMONARY PROCESS     ECG:                 SR,  60 rate    LEXISCAN MYOVIEW:  Negative for Ischemia  LVEF 86%          Torres Ferrara MD ,[address] [place] [address] Cardiology

## 2018-03-16 NOTE — PROGRESS NOTES
Department of Internal Medicine  Progress Note      SUBJECTIVE: Continues to have wheezing and complains about pressure like feeling in chest  No acute events overnight.        ROS:  All 12 systems reviewed and negative except mentioned in HPI and Assessment and plan    MEDICATIONS:  Current Facility-Administered Medications   Medication Dose Route Frequency Provider Last Rate Last Dose    enoxaparin (LOVENOX) injection 40 mg  40 mg Subcutaneous Daily Grace Diamond DO   40 mg at 03/15/18 2138    ipratropium-albuterol (DUONEB) nebulizer solution 1 ampule  1 ampule Inhalation TID Grace Diamond DO   1 ampule at 03/16/18 0818    theophylline (REMY-24) extended release capsule 400 mg  400 mg Oral Daily Tata Waddell MD   400 mg at 03/16/18 0831    benzonatate (TESSALON) capsule 100 mg  100 mg Oral TID PRN Smith Newell MD   100 mg at 03/14/18 1226    benzocaine-menthol (CEPACOL SORE THROAT) lozenge 1 lozenge  1 lozenge Oral Q2H PRN Smith Newell MD   4 lozenge at 03/16/18 0831    ALPRAZolam (XANAX) tablet 0.25 mg  0.25 mg Oral TID PRN Tata Waddell MD   0.25 mg at 03/13/18 1739    ALPRAZolam (XANAX) tablet 0.5 mg  0.5 mg Oral Nightly PRN Smith Nguyen MD   0.5 mg at 03/15/18 2320    cefTRIAXone (ROCEPHIN) 1 g IVPB in 50 mL D5W minibag  1 g Intravenous Q24H Grace Diamond DO   Stopped at 03/15/18 1043    guaiFENesin (MUCINEX) extended release tablet 600 mg  600 mg Oral BID Nirav Felix MD   600 mg at 03/16/18 0831    sodium chloride flush 0.9 % injection 10 mL  10 mL Intravenous PRN Brielle Kuo MD   10 mL at 03/09/18 0950    verapamil (CALAN SR) extended release tablet 180 mg  180 mg Oral BID Brielle Kuo MD   180 mg at 03/16/18 0831    docusate sodium (COLACE) capsule 100 mg  100 mg Oral BID Brielle Kuo MD   Stopped at 03/14/18 2014    simvastatin (ZOCOR) tablet 20 mg  20 mg Oral Nightly Brielle Kuo MD   20 mg at 03/15/18 2139    albuterol (PROVENTIL) NEUROLOGIC:  Alert, awake, oriented x 3. No FND  EXTREMITIES: Warm and well perfused. LABS  Recent Labs      03/14/18   0530   WBC  17.1*   RBC  3.43*   HGB  10.7*   HCT  32.0*   MCV  93.2   MCH  31.3   MCHC  33.6   RDW  13.5   PLT  263       Recent Labs      03/14/18   0530   NA  130*   K  4.2   CL  89*   CO2  30*   BUN  30*   CREATININE  1.07*   GLUCOSE  163*   CALCIUM  7.6*       No results for input(s): MG in the last 72 hours. ASSESSMENT AND PLAN    Active Hospital Problems    Diagnosis Date Noted    RO (acute kidney injury) ([facility] [N17.9] 03/14/2018    COPD exacerbation ([facility] [J44.1] 03/07/2018    History of tobacco abuse [Z87.891] 03/01/2013    Hypertension [I10]      - Acute on chronic hypoxic reps failure: secondary to COPD exacerbation. On high flow oxygen, on breathing treatments, Acapella, IS and VEST therapy. Pulm following. Continue rocephin day 6 today. Last dose tomorrow  - Chest pain: probably due to chronic coughing.  Cardiac cause rule out by recent stress test. Cardio following  - RO: resolved  - Anxiety: On xanax  - HTN: controlled  - DVT prophylaxis: Lovenox    Andres Duncan MD  Pager : [phone]

## 2018-03-16 NOTE — PROGRESS NOTES
48.9*   GFRAA  59.2*         No results for input(s): PHART, LND8QAU, PO2ART, VLH8KQL, BEART, G0IZHCVR in the last 72 hours. O2 Device: Nasal cannula  O2 Flow Rate (L/min): 4 L/min  Lab Results   Component Value Date    DAYRON 2.6 03/07/2018        MEDICATIONS during current hospitalization:    Continuous Infusions:    Scheduled Meds:   verapamil  240 mg Oral BID    enoxaparin  40 mg Subcutaneous Daily    ipratropium-albuterol  1 ampule Inhalation TID    theophylline  400 mg Oral Daily    cefTRIAXone (ROCEPHIN) IV  1 g Intravenous Q24H    guaiFENesin  600 mg Oral BID    docusate sodium  100 mg Oral BID    simvastatin  20 mg Oral Nightly    sodium chloride flush  10 mL Intravenous 2 times per day    methylPREDNISolone  40 mg Intravenous Q8H    aspirin  81 mg Oral Daily    hydrochlorothiazide  25 mg Oral Daily    montelukast  10 mg Oral Daily    valsartan  160 mg Oral Daily       PRN Meds:benzonatate, benzocaine-menthol, ALPRAZolam, ALPRAZolam, sodium chloride flush, albuterol, nitroGLYCERIN, morphine, sodium chloride flush, acetaminophen, magnesium hydroxide, ondansetron    Radiology      Cta Chest W Wo Contrast    Result Date: 3/13/2018  The EXAMINATION: CT scan of the chest with contrast (pulmonary embolism protocol) INDICATION: Midsternal chest pain COMPARISON: None TECHNIQUE: Helical CT was performed through the chest utilizing 100 cc of Isovue-370 intravenous contrast.  Images were obtained with bolus tracking in order to opacify the pulmonary arteries. There is no comparison available. Both MIP and 3D volume rendered reconstructions were performed. FINDINGS: There are no findings of central or segmental pulmonary emboli. A stable area of there is mild to moderate emphysematous disease in the upper one third of the lung parenchyma. There is areas atelectasis, scarring within the right lower lobe. No focal parenchymal abnormalities no pleural effusions no pneumothoraces.  No significant mediastinal adenopathy. In the field-of-view there is a small hiatal hernia. Within the field-of-view there is left perinephric stranding and partially visualized, partially exophytic area of low-attenuation at the lateral interpolar region left kidney. There is multilevel degenerative changes with bridging osteophytes of the visualized thoracic spine. Osseous structures are intact. 1.  NO EVIDENCE OF CENTRAL OR SEGMENTAL PULMONARY EMBOLISM. 2. MILD TO MODERATE EMPHYSEMATOUS DISEASE. 3.  SMALL HIATAL HERNIA. 4.  PARTIALLY VISUALIZED PERINEPHRIC STRANDING LEFT KIDNEY AND PARTIALLY VISUALIZED PROBABLE RENAL CYST. CORRELATE WITH URINALYSIS CONSIDER ULTRASOUND TO FURTHER EVALUATE IF CLINICALLY INDICATED. All CT scans at this facility use dose modulation, iterative reconstruction, and/or weight based dosing when appropriate to reduce radiation dose to as low as reasonably achievable. ASSESSMENT /Plan:  1.  Acute COPD exacerbation secondary to bronchitis,  will continue with aggressive pulmonary toileting, guaifenesin, heated humidified, vest therapy.     2.  Acute respiratory distress ,  stable at rest but severely dyspneic with exertion  3.  Chest pain rule out etiology, likely noncardiac  4.  Hypertension  5.  Anxiety, associated with acute illness and severe dyspnea    -Patient unfortunately refused ENT recommendation for flexible laryngoscopic evaluation despite recommendations to rule out vocal cord dysfunction  -Continue bronchodilator therapy   - Patient also refusing bronchoscopy at this time  -Continue IV steroids  -Flutter valve  -incentive spirometer   -Will review with Dr. Cristofer Muñoz for further recommendations as patient is refusing ENT work up to rule out vocal cord dysfunction      Electronically signed by Francisco J Walsh PA-C on 3/16/2018 at 3:50 PM

## 2018-03-17 LAB
ANION GAP SERPL CALCULATED.3IONS-SCNC: 13 MEQ/L (ref 7–13)
ANISOCYTOSIS: ABNORMAL
BASOPHILS ABSOLUTE: 0 K/UL (ref 0–0.2)
BASOPHILS RELATIVE PERCENT: 0 %
BUN BLDV-MCNC: 42 MG/DL (ref 8–23)
CALCIUM SERPL-MCNC: 8 MG/DL (ref 8.6–10.2)
CHLORIDE BLD-SCNC: 86 MEQ/L (ref 98–107)
CO2: 29 MEQ/L (ref 22–29)
CREAT SERPL-MCNC: 1.28 MG/DL (ref 0.5–0.9)
EKG ATRIAL RATE: 101 BPM
EKG ATRIAL RATE: 107 BPM
EKG ATRIAL RATE: 109 BPM
EKG ATRIAL RATE: 66 BPM
EKG ATRIAL RATE: 71 BPM
EKG ATRIAL RATE: 82 BPM
EKG P AXIS: 68 DEGREES
EKG P AXIS: 69 DEGREES
EKG P AXIS: 71 DEGREES
EKG P AXIS: 81 DEGREES
EKG P-R INTERVAL: 170 MS
EKG P-R INTERVAL: 174 MS
EKG P-R INTERVAL: 184 MS
EKG P-R INTERVAL: 196 MS
EKG P-R INTERVAL: 204 MS
EKG P-R INTERVAL: 208 MS
EKG Q-T INTERVAL: 364 MS
EKG Q-T INTERVAL: 370 MS
EKG Q-T INTERVAL: 380 MS
EKG Q-T INTERVAL: 386 MS
EKG Q-T INTERVAL: 402 MS
EKG Q-T INTERVAL: 422 MS
EKG QRS DURATION: 74 MS
EKG QRS DURATION: 76 MS
EKG QRS DURATION: 86 MS
EKG QRS DURATION: 86 MS
EKG QTC CALCULATION (BAZETT): 419 MS
EKG QTC CALCULATION (BAZETT): 442 MS
EKG QTC CALCULATION (BAZETT): 469 MS
EKG QTC CALCULATION (BAZETT): 485 MS
EKG QTC CALCULATION (BAZETT): 492 MS
EKG QTC CALCULATION (BAZETT): 498 MS
EKG R AXIS: 36 DEGREES
EKG R AXIS: 39 DEGREES
EKG R AXIS: 41 DEGREES
EKG R AXIS: 41 DEGREES
EKG R AXIS: 49 DEGREES
EKG R AXIS: 53 DEGREES
EKG T AXIS: 50 DEGREES
EKG T AXIS: 53 DEGREES
EKG T AXIS: 61 DEGREES
EKG T AXIS: 62 DEGREES
EKG T AXIS: 64 DEGREES
EKG T AXIS: 68 DEGREES
EKG VENTRICULAR RATE: 101 BPM
EKG VENTRICULAR RATE: 107 BPM
EKG VENTRICULAR RATE: 109 BPM
EKG VENTRICULAR RATE: 66 BPM
EKG VENTRICULAR RATE: 71 BPM
EKG VENTRICULAR RATE: 82 BPM
EOSINOPHILS ABSOLUTE: 0 K/UL (ref 0–0.7)
EOSINOPHILS RELATIVE PERCENT: 0 %
GFR AFRICAN AMERICAN: 48.2
GFR NON-AFRICAN AMERICAN: 39.8
GLUCOSE BLD-MCNC: 168 MG/DL (ref 74–109)
HCT VFR BLD CALC: 31.2 % (ref 37–47)
HEMOGLOBIN: 10.7 G/DL (ref 12–16)
HYPOCHROMIA: 0
LYMPHOCYTES ABSOLUTE: 1.1 K/UL (ref 1–4.8)
LYMPHOCYTES RELATIVE PERCENT: 5 %
MACROCYTES: 0
MCH RBC QN AUTO: 31.9 PG (ref 27–31.3)
MCHC RBC AUTO-ENTMCNC: 34.3 % (ref 33–37)
MCV RBC AUTO: 92.9 FL (ref 82–100)
METAMYELOCYTES RELATIVE PERCENT: 1 %
MICROCYTES: 0
MONOCYTES ABSOLUTE: 0.6 K/UL (ref 0.2–0.8)
MONOCYTES RELATIVE PERCENT: 3 %
NEUTROPHILS ABSOLUTE: 19.3 K/UL (ref 1.4–6.5)
NEUTROPHILS RELATIVE PERCENT: 91 %
PDW BLD-RTO: 13.7 % (ref 11.5–14.5)
PLATELET # BLD: 326 K/UL (ref 130–400)
PLATELET SLIDE REVIEW: ADEQUATE
POIKILOCYTES: 0
POLYCHROMASIA: 0
POTASSIUM SERPL-SCNC: 3.9 MEQ/L (ref 3.5–5.1)
RBC # BLD: 3.36 M/UL (ref 4.2–5.4)
SMUDGE CELLS: 2
SODIUM BLD-SCNC: 128 MEQ/L (ref 132–144)
WBC # BLD: 21 K/UL (ref 4.8–10.8)

## 2018-03-17 PROCEDURE — 94668 MNPJ CHEST WALL SBSQ: CPT

## 2018-03-17 PROCEDURE — 6360000002 HC RX W HCPCS: Performed by: INTERNAL MEDICINE

## 2018-03-17 PROCEDURE — 94003 VENT MGMT INPAT SUBQ DAY: CPT

## 2018-03-17 PROCEDURE — 2580000003 HC RX 258: Performed by: INTERNAL MEDICINE

## 2018-03-17 PROCEDURE — 6370000000 HC RX 637 (ALT 250 FOR IP): Performed by: INTERNAL MEDICINE

## 2018-03-17 PROCEDURE — 94002 VENT MGMT INPAT INIT DAY: CPT

## 2018-03-17 PROCEDURE — 2060000000 HC ICU INTERMEDIATE R&B

## 2018-03-17 PROCEDURE — 85025 COMPLETE CBC W/AUTO DIFF WBC: CPT

## 2018-03-17 PROCEDURE — 99233 SBSQ HOSP IP/OBS HIGH 50: CPT | Performed by: INTERNAL MEDICINE

## 2018-03-17 PROCEDURE — 36415 COLL VENOUS BLD VENIPUNCTURE: CPT

## 2018-03-17 PROCEDURE — 94640 AIRWAY INHALATION TREATMENT: CPT

## 2018-03-17 PROCEDURE — 93005 ELECTROCARDIOGRAM TRACING: CPT

## 2018-03-17 PROCEDURE — 2700000000 HC OXYGEN THERAPY PER DAY

## 2018-03-17 PROCEDURE — 6370000000 HC RX 637 (ALT 250 FOR IP): Performed by: NURSE PRACTITIONER

## 2018-03-17 PROCEDURE — 94664 DEMO&/EVAL PT USE INHALER: CPT

## 2018-03-17 PROCEDURE — 80048 BASIC METABOLIC PNL TOTAL CA: CPT

## 2018-03-17 RX ORDER — PANTOPRAZOLE SODIUM 40 MG/1
40 TABLET, DELAYED RELEASE ORAL
Status: DISCONTINUED | OUTPATIENT
Start: 2018-03-18 | End: 2018-03-21 | Stop reason: HOSPADM

## 2018-03-17 RX ORDER — CALCIUM CARBONATE 200(500)MG
500 TABLET,CHEWABLE ORAL 3 TIMES DAILY PRN
Status: DISCONTINUED | OUTPATIENT
Start: 2018-03-17 | End: 2018-03-21 | Stop reason: HOSPADM

## 2018-03-17 RX ADMIN — Medication 10 ML: at 09:14

## 2018-03-17 RX ADMIN — IPRATROPIUM BROMIDE AND ALBUTEROL SULFATE 1 AMPULE: .5; 3 SOLUTION RESPIRATORY (INHALATION) at 19:28

## 2018-03-17 RX ADMIN — ALPRAZOLAM 0.25 MG: 0.25 TABLET ORAL at 04:03

## 2018-03-17 RX ADMIN — IPRATROPIUM BROMIDE AND ALBUTEROL SULFATE 1 AMPULE: .5; 3 SOLUTION RESPIRATORY (INHALATION) at 07:49

## 2018-03-17 RX ADMIN — GUAIFENESIN 600 MG: 600 TABLET, EXTENDED RELEASE ORAL at 09:14

## 2018-03-17 RX ADMIN — NITROGLYCERIN 0.5 INCH: 20 OINTMENT TOPICAL at 17:36

## 2018-03-17 RX ADMIN — ASPIRIN 81 MG 81 MG: 81 TABLET ORAL at 09:13

## 2018-03-17 RX ADMIN — ENOXAPARIN SODIUM 40 MG: 40 INJECTION SUBCUTANEOUS at 20:29

## 2018-03-17 RX ADMIN — VALSARTAN 160 MG: 160 TABLET ORAL at 09:14

## 2018-03-17 RX ADMIN — NITROGLYCERIN 0.5 INCH: 20 OINTMENT TOPICAL at 23:12

## 2018-03-17 RX ADMIN — ALPRAZOLAM 0.5 MG: 0.5 TABLET ORAL at 23:12

## 2018-03-17 RX ADMIN — GUAIFENESIN 600 MG: 600 TABLET, EXTENDED RELEASE ORAL at 20:29

## 2018-03-17 RX ADMIN — IPRATROPIUM BROMIDE AND ALBUTEROL SULFATE 1 AMPULE: .5; 3 SOLUTION RESPIRATORY (INHALATION) at 14:27

## 2018-03-17 RX ADMIN — HYDROCHLOROTHIAZIDE 25 MG: 25 TABLET ORAL at 09:13

## 2018-03-17 RX ADMIN — Medication 10 ML: at 20:31

## 2018-03-17 RX ADMIN — MONTELUKAST SODIUM 10 MG: 10 TABLET, FILM COATED ORAL at 20:29

## 2018-03-17 RX ADMIN — METHYLPREDNISOLONE SODIUM SUCCINATE 40 MG: 40 INJECTION, POWDER, FOR SOLUTION INTRAMUSCULAR; INTRAVENOUS at 02:24

## 2018-03-17 RX ADMIN — CEFTRIAXONE 1 G: 1 INJECTION, POWDER, FOR SOLUTION INTRAMUSCULAR; INTRAVENOUS at 10:13

## 2018-03-17 RX ADMIN — THEOPHYLLINE ANHYDROUS 400 MG: 300 CAPSULE, EXTENDED RELEASE ORAL at 09:13

## 2018-03-17 RX ADMIN — METHYLPREDNISOLONE SODIUM SUCCINATE 40 MG: 40 INJECTION, POWDER, FOR SOLUTION INTRAMUSCULAR; INTRAVENOUS at 10:13

## 2018-03-17 RX ADMIN — METHYLPREDNISOLONE SODIUM SUCCINATE 40 MG: 40 INJECTION, POWDER, FOR SOLUTION INTRAMUSCULAR; INTRAVENOUS at 17:10

## 2018-03-17 RX ADMIN — SIMVASTATIN 20 MG: 20 TABLET, FILM COATED ORAL at 20:28

## 2018-03-17 RX ADMIN — VERAPAMIL HYDROCHLORIDE 240 MG: 240 TABLET, FILM COATED, EXTENDED RELEASE ORAL at 20:29

## 2018-03-17 RX ADMIN — VERAPAMIL HYDROCHLORIDE 240 MG: 240 TABLET, FILM COATED, EXTENDED RELEASE ORAL at 09:14

## 2018-03-17 ASSESSMENT — PAIN SCALES - GENERAL
PAINLEVEL_OUTOF10: 0
PAINLEVEL_OUTOF10: 0

## 2018-03-17 NOTE — PROGRESS NOTES
pack years  []   Pulmonary Disorder  (acute or chronic)  [x]   Severe or Chronic w/ Exacerbation  []     Surgical Status No [x]   Surgeries     General []   Surgery Lower []   Abdominal Thoracic or []   Upper Abdominal Thoracic with  PulmonaryDisorder  []     Chest X-ray Clear/Not  Ordered     [x]  Chronic Changes  Results Pending  []  Infiltrates, atelectasis, pleural effusion, or edema  []  Infiltrates in more than one lobe []  Infiltrate + Atelectasis, &/or pleural effusion  []    Respiratory Pattern Regular,  RR = 12-20 [x]  Increased,  RR = 21-25 []  BECKER, irregular,  or RR = 26-30 []  Decreased FEV1  or RR = 31-35 []  Severe SOB, use  of accessory muscles, or RR ? 35  []    Mental Status Alert, oriented,  Cooperative [x]  Confused but Follows commands []  Lethargic or unable to follow commands []  Obtunded  []  Comatose  []    Breath Sounds Clear to  auscultation  []  Decreased unilaterally or  in bases only []  Decreased  bilaterally  []  Crackles or intermittent wheezes [x]  Wheezes []    Cough Strong, Spontan., & nonproductive [x]  Strong,  spontaneous, &  productive []  Weak,  Nonproductive []  Weak, productive or  with wheezes []  No spontaneous  cough or may require suctioning []    Level of Activity Ambulatory []  Ambulatory w/ Assist  [x]  Non-ambulatory []  Paraplegic []  Quadriplegic []    Total    Score:____7___     Triage Score:___4_____      Tri       Triage:     1. (>20) Freq: Q3    2. (16-20) Freq: Q4   3. (11-15) Freq: QID & Albuterol Q2 PRN    4. (6-10) Freq: TID & Albuterol Q2 PRN    5. (0-5) Freq Q4prn      duoneb

## 2018-03-17 NOTE — PROGRESS NOTES
Oral, Daily  montelukast (SINGULAIR) tablet 10 mg, 10 mg, Oral, Daily  valsartan (DIOVAN) tablet 160 mg, 160 mg, Oral, Daily  Physical    VITALS:  BP (!) 143/54   Pulse 82   Temp 98.4 °F (36.9 °C) (Oral)   Resp 20   Ht 5' 3\" (1.6 m)   Wt 185 lb 9.6 oz (84.2 kg)   SpO2 97%   BMI 32.88 kg/m²   Upper airway noise  LUNGS:  No increased work of breathing, good air exchange, clear to auscultation bilaterally, no crackles or wheezing  CARDIOVASCULAR:  Normal apical impulse, regular rate and rhythm, normal S1 and S2, no S3 or S4, and no murmur noted  MUSCULOSKELETAL:  There is no redness, warmth, or swelling of the joints. Full range of motion noted. Motor strength is 5 out of 5 all extremities bilaterally.   Tone is normal.  Data    CBC with Differential:    Lab Results   Component Value Date    WBC 21.0 03/17/2018    RBC 3.36 03/17/2018    RBC 4.37 10/25/2011    HGB 10.7 03/17/2018    HCT 31.2 03/17/2018     03/17/2018    MCV 92.9 03/17/2018    MCH 31.9 03/17/2018    MCHC 34.3 03/17/2018    RDW 13.7 03/17/2018    BANDSPCT 2 03/14/2018    METASPCT 1 03/17/2018    LYMPHOPCT 5.0 03/17/2018    MONOPCT 3.0 03/17/2018    MYELOPCT 1 03/14/2018    EOSPCT 5.6 10/25/2011    BASOPCT 0.0 03/17/2018    MONOSABS 0.6 03/17/2018    LYMPHSABS 1.1 03/17/2018    EOSABS 0.0 03/17/2018    BASOSABS 0.0 03/17/2018     BMP:    Lab Results   Component Value Date     03/17/2018    K 3.9 03/17/2018    K 4.3 03/08/2018    CL 86 03/17/2018    CO2 29 03/17/2018    BUN 42 03/17/2018    LABALBU 3.9 03/11/2018    LABALBU 4.5 10/25/2011    CREATININE 1.28 03/17/2018    CALCIUM 8.0 03/17/2018    GFRAA 48.2 03/17/2018    LABGLOM 39.8 03/17/2018    GLUCOSE 168 03/17/2018    GLUCOSE 104 10/25/2011       ASSESSMENT AND PLAN      Patient Active Hospital Problem List:   Hypertension ()   History of tobacco abuse (3/1/2013)   COPD exacerbation ([facility] (3/7/2018)   RO (acute kidney injury) ([facility] (3/14/2018)   Respiratory distress ()    Home

## 2018-03-17 NOTE — PROGRESS NOTES
Assumed care of pt. Reassessment documented. Pt denies any pain. Lungs sounds have wheezing in lobes noted. Barky cough that's non productive is frequent.  is at the bedside. Call light in reach. Will monitor.  Electronically signed by Letty Kelly RN on 3/17/2018 at 12:35 PM

## 2018-03-17 NOTE — FLOWSHEET NOTE
Dr. Fabio Leung called to verify results of CT of sinus. He inquired if the patient would possibly tolerate the laryngoscope with numbing. Pt continues to decline procedure. Physician verified that patient was receiving antibiotics. Dr. Fabio Leung (ENT) stated that he signs off.  Electronically signed by Zander Dasilva RN on 3/17/2018 at 9:41 AM

## 2018-03-17 NOTE — CARE COORDINATION
PT DECLINES BRONCH AND LARYNGOSCOPY. D/C PLAN IS HOME AT DISCHARGE, DECLINED PALL CARE. AWAITING PULMONARY PLAN.

## 2018-03-17 NOTE — PROGRESS NOTES
[facility] [place] Note      Patient: Nubia Vickers  Unit/Bed: C232/C453-87  YOB: 1934  MRN: [id number]  Admit Date:  3/7/2018      Subjective Complaint:   I have some discomfort in the lower part of my chest, usually when I get this I take a nitroglycerin tablet and it goes away. Physical Examination:     BP (!) 130/47   Pulse 91   Temp 98.4 °F (36.9 °C) (Oral)   Resp 20   Ht 5' 3\" (1.6 m)   Wt 185 lb 9.6 oz (84.2 kg)   SpO2 99%   BMI 32.88 kg/m²       Intake/Output Summary (Last 24 hours) at 03/17/18 1552  Last data filed at 03/17/18 0935   Gross per 24 hour   Intake              980 ml   Output                0 ml   Net              980 ml                  Deyanira Reid examined at bedside . in no apparent distress. Focused exam reveals:     Cardiac: Heart regular rate and rhythm     Lungs:  Distant breath sounds with expiratory wheezes posterior lung fields. Extremities:   No edema. Telemetry:      sinus tachycardia         LABS:   CBC:   Recent Labs      03/17/18   0529   WBC  21.0*   HGB  10.7*   PLT  326      BMP:  Recent Labs      03/17/18   0528   NA  128*   K  3.9   CL  86*   CO2  29   BUN  42*   CREATININE  1.28*   GLUCOSE  168*              Troponin: No results for input(s): TROPONINT in the last 72 hours. BNP: No results for input(s): PROBNP in the last 72 hours. INR: No results for input(s): INR in the last 72 hours. Mg: No results for input(s): MG in the last 72 hours. Cardiac Testing:    none    Assessment:      Reactive airway disease exacerbation. Lower anterior chest discomfort with prior negative cardiac workup and negative troponin, see Dr. Alie Redmond notes of 3/16/18. Sinus tachycardia possibly related to acute respiratory issues and aerosol treatment. Verapamil dose has been increased. Lower anterior chest discomfort is most likely related to esophageal spasm      Tachycardia has improved with up titration of verapamil.   GE reflux however is likely to worsen. Hyponatremia  Plan:  1. Add topical nitrates, and GI prophylaxis. 2. DC hydrochlorothiazide in view of hyponatremia. 3. Discharge planning per primary service.   Electronically signed by Katy Nelson MD on 3/17/2018 at 3:52 PM

## 2018-03-17 NOTE — PROGRESS NOTES
hours.  O2 Device: Nasal cannula  O2 Flow Rate (L/min): 3.5 L/min  Lab Results   Component Value Date    DAYRON 2.6 03/07/2018        MEDICATIONS during current hospitalization:    Continuous Infusions:    Scheduled Meds:   nitroglycerin  0.5 inch Topical 4 times per day    [START ON 3/18/2018] pantoprazole  40 mg Oral QAM AC    verapamil  240 mg Oral BID    enoxaparin  40 mg Subcutaneous Daily    ipratropium-albuterol  1 ampule Inhalation TID    theophylline  400 mg Oral Daily    guaiFENesin  600 mg Oral BID    docusate sodium  100 mg Oral BID    simvastatin  20 mg Oral Nightly    sodium chloride flush  10 mL Intravenous 2 times per day    methylPREDNISolone  40 mg Intravenous Q8H    aspirin  81 mg Oral Daily    montelukast  10 mg Oral Daily    valsartan  160 mg Oral Daily       PRN Meds:calcium carbonate, benzonatate, benzocaine-menthol, ALPRAZolam, ALPRAZolam, sodium chloride flush, albuterol, nitroGLYCERIN, morphine, sodium chloride flush, acetaminophen, magnesium hydroxide, ondansetron    Radiology      Cta Chest W Wo Contrast    Result Date: 3/13/2018  The EXAMINATION: CT scan of the chest with contrast (pulmonary embolism protocol) INDICATION: Midsternal chest pain COMPARISON: None TECHNIQUE: Helical CT was performed through the chest utilizing 100 cc of Isovue-370 intravenous contrast.  Images were obtained with bolus tracking in order to opacify the pulmonary arteries. There is no comparison available. Both MIP and 3D volume rendered reconstructions were performed. FINDINGS: There are no findings of central or segmental pulmonary emboli. A stable area of there is mild to moderate emphysematous disease in the upper one third of the lung parenchyma. There is areas atelectasis, scarring within the right lower lobe. No focal parenchymal abnormalities no pleural effusions no pneumothoraces. No significant mediastinal adenopathy. In the field-of-view there is a small hiatal hernia.  Within the field-of-view there is left perinephric stranding and partially visualized, partially exophytic area of low-attenuation at the lateral interpolar region left kidney. There is multilevel degenerative changes with bridging osteophytes of the visualized thoracic spine. Osseous structures are intact. 1.  NO EVIDENCE OF CENTRAL OR SEGMENTAL PULMONARY EMBOLISM. 2. MILD TO MODERATE EMPHYSEMATOUS DISEASE. 3.  SMALL HIATAL HERNIA. 4.  PARTIALLY VISUALIZED PERINEPHRIC STRANDING LEFT KIDNEY AND PARTIALLY VISUALIZED PROBABLE RENAL CYST. CORRELATE WITH URINALYSIS CONSIDER ULTRASOUND TO FURTHER EVALUATE IF CLINICALLY INDICATED. All CT scans at this facility use dose modulation, iterative reconstruction, and/or weight based dosing when appropriate to reduce radiation dose to as low as reasonably achievable. CT SINUS WO CONTRAST [[id number]] Collected: 03/16/18 1702      Order Status: Completed Updated: 03/16/18 1712     Narrative:       EXAMINATION:  CT SINUS WO CONTRAST    CLINICAL HISTORY:   sinusitis     COMPARISONS:  NONE AVAILABLE    TECHNIQUE:  Spiral axial images of the maxillofacial region were obtained, specific attention of the paranasal sinuses. Multiplanar two-dimensional reformatting was performed. FINDINGS: Yuliana Hopping is an approximately 1.9 cm convex soft tissue structure in the dependent portion of the right maxillary sinus. There is 2 immediately adjacent approximately 8mm diameter soft tissue rounded structures in the dependent portion of the left   maxillary sinus. There is no fluid accumulating in paranasal sinuses. Ethmoid frontal and sphenoid sinuses are unremarkable. The nasal septum is midline. The posterior portion of the nasal septum is perforated and partially resected, compatible with   prior surgery.  Nasal turbinates are unremarkable.     Impression:       INFLAMMATORY POLYPS VERSUS MUCOUS RETENTION CYSTS IN THE MAXILLARY SINUSES WITHOUT ACCUMULATION OF FLUID IN THE MAXILLARY SINUSES TO SUGGEST

## 2018-03-18 PROCEDURE — 94640 AIRWAY INHALATION TREATMENT: CPT

## 2018-03-18 PROCEDURE — 6370000000 HC RX 637 (ALT 250 FOR IP): Performed by: INTERNAL MEDICINE

## 2018-03-18 PROCEDURE — 2700000000 HC OXYGEN THERAPY PER DAY

## 2018-03-18 PROCEDURE — 99232 SBSQ HOSP IP/OBS MODERATE 35: CPT | Performed by: INTERNAL MEDICINE

## 2018-03-18 PROCEDURE — 6370000000 HC RX 637 (ALT 250 FOR IP): Performed by: NURSE PRACTITIONER

## 2018-03-18 PROCEDURE — 6360000002 HC RX W HCPCS: Performed by: INTERNAL MEDICINE

## 2018-03-18 PROCEDURE — 94668 MNPJ CHEST WALL SBSQ: CPT

## 2018-03-18 PROCEDURE — 2060000000 HC ICU INTERMEDIATE R&B

## 2018-03-18 PROCEDURE — 2580000003 HC RX 258: Performed by: INTERNAL MEDICINE

## 2018-03-18 PROCEDURE — 94760 N-INVAS EAR/PLS OXIMETRY 1: CPT

## 2018-03-18 RX ADMIN — METHYLPREDNISOLONE SODIUM SUCCINATE 40 MG: 40 INJECTION, POWDER, FOR SOLUTION INTRAMUSCULAR; INTRAVENOUS at 10:38

## 2018-03-18 RX ADMIN — IPRATROPIUM BROMIDE AND ALBUTEROL SULFATE 1 AMPULE: .5; 3 SOLUTION RESPIRATORY (INHALATION) at 19:06

## 2018-03-18 RX ADMIN — BENZOCAINE AND MENTHOL 1 LOZENGE: 15; 3.6 LOZENGE ORAL at 09:05

## 2018-03-18 RX ADMIN — VALSARTAN 160 MG: 160 TABLET ORAL at 09:04

## 2018-03-18 RX ADMIN — IPRATROPIUM BROMIDE AND ALBUTEROL SULFATE 1 AMPULE: .5; 3 SOLUTION RESPIRATORY (INHALATION) at 05:08

## 2018-03-18 RX ADMIN — IPRATROPIUM BROMIDE AND ALBUTEROL SULFATE 1 AMPULE: .5; 3 SOLUTION RESPIRATORY (INHALATION) at 15:30

## 2018-03-18 RX ADMIN — VERAPAMIL HYDROCHLORIDE 240 MG: 240 TABLET, FILM COATED, EXTENDED RELEASE ORAL at 22:16

## 2018-03-18 RX ADMIN — Medication 10 ML: at 22:19

## 2018-03-18 RX ADMIN — THEOPHYLLINE ANHYDROUS 400 MG: 300 CAPSULE, EXTENDED RELEASE ORAL at 09:04

## 2018-03-18 RX ADMIN — Medication 10 ML: at 10:38

## 2018-03-18 RX ADMIN — NITROGLYCERIN 0.5 INCH: 20 OINTMENT TOPICAL at 05:37

## 2018-03-18 RX ADMIN — ENOXAPARIN SODIUM 40 MG: 40 INJECTION SUBCUTANEOUS at 22:19

## 2018-03-18 RX ADMIN — MONTELUKAST SODIUM 10 MG: 10 TABLET, FILM COATED ORAL at 22:20

## 2018-03-18 RX ADMIN — VERAPAMIL HYDROCHLORIDE 240 MG: 240 TABLET, FILM COATED, EXTENDED RELEASE ORAL at 09:04

## 2018-03-18 RX ADMIN — ALPRAZOLAM 0.25 MG: 0.25 TABLET ORAL at 03:56

## 2018-03-18 RX ADMIN — GUAIFENESIN 600 MG: 600 TABLET, EXTENDED RELEASE ORAL at 09:05

## 2018-03-18 RX ADMIN — NITROGLYCERIN 0.5 INCH: 20 OINTMENT TOPICAL at 14:40

## 2018-03-18 RX ADMIN — METHYLPREDNISOLONE SODIUM SUCCINATE 40 MG: 40 INJECTION, POWDER, FOR SOLUTION INTRAMUSCULAR; INTRAVENOUS at 01:48

## 2018-03-18 RX ADMIN — SIMVASTATIN 20 MG: 20 TABLET, FILM COATED ORAL at 22:16

## 2018-03-18 RX ADMIN — PANTOPRAZOLE SODIUM 40 MG: 40 TABLET, DELAYED RELEASE ORAL at 05:37

## 2018-03-18 RX ADMIN — NITROGLYCERIN 0.5 INCH: 20 OINTMENT TOPICAL at 17:46

## 2018-03-18 RX ADMIN — GUAIFENESIN 600 MG: 600 TABLET, EXTENDED RELEASE ORAL at 22:18

## 2018-03-18 RX ADMIN — IPRATROPIUM BROMIDE AND ALBUTEROL SULFATE 1 AMPULE: .5; 3 SOLUTION RESPIRATORY (INHALATION) at 10:47

## 2018-03-18 RX ADMIN — ASPIRIN 81 MG 81 MG: 81 TABLET ORAL at 09:05

## 2018-03-18 RX ADMIN — METHYLPREDNISOLONE SODIUM SUCCINATE 40 MG: 40 INJECTION, POWDER, FOR SOLUTION INTRAMUSCULAR; INTRAVENOUS at 18:51

## 2018-03-18 NOTE — PROGRESS NOTES
the lateral interpolar region left kidney. There is multilevel degenerative changes with bridging osteophytes of the visualized thoracic spine. Osseous structures are intact. 1.  NO EVIDENCE OF CENTRAL OR SEGMENTAL PULMONARY EMBOLISM. 2. MILD TO MODERATE EMPHYSEMATOUS DISEASE. 3.  SMALL HIATAL HERNIA. 4.  PARTIALLY VISUALIZED PERINEPHRIC STRANDING LEFT KIDNEY AND PARTIALLY VISUALIZED PROBABLE RENAL CYST. CORRELATE WITH URINALYSIS CONSIDER ULTRASOUND TO FURTHER EVALUATE IF CLINICALLY INDICATED. All CT scans at this facility use dose modulation, iterative reconstruction, and/or weight based dosing when appropriate to reduce radiation dose to as low as reasonably achievable. CT SINUS WO CONTRAST [[id number]] Collected: 03/16/18 1702      Order Status: Completed Updated: 03/16/18 1712     Narrative:       EXAMINATION:  CT SINUS WO CONTRAST    CLINICAL HISTORY:   sinusitis     COMPARISONS:  NONE AVAILABLE    TECHNIQUE:  Spiral axial images of the maxillofacial region were obtained, specific attention of the paranasal sinuses. Multiplanar two-dimensional reformatting was performed. FINDINGS: Daysi Laughter is an approximately 1.9 cm convex soft tissue structure in the dependent portion of the right maxillary sinus. There is 2 immediately adjacent approximately 8mm diameter soft tissue rounded structures in the dependent portion of the left   maxillary sinus. There is no fluid accumulating in paranasal sinuses. Ethmoid frontal and sphenoid sinuses are unremarkable. The nasal septum is midline. The posterior portion of the nasal septum is perforated and partially resected, compatible with   prior surgery. Nasal turbinates are unremarkable.     Impression:       INFLAMMATORY POLYPS VERSUS MUCOUS RETENTION CYSTS IN THE MAXILLARY SINUSES WITHOUT ACCUMULATION OF FLUID IN THE MAXILLARY SINUSES TO SUGGEST ACUTE SINUSITIS. THERE IS NO OBSTRUCTING NASAL CAVITY LESION.  THERE IS PERFORATION OF THE POSTERIOR   ASPECT OF THE NASAL

## 2018-03-18 NOTE — PROGRESS NOTES
[facility] [place] Note      Patient: Miya Vasquez  Unit/Bed: J021/J897-93  YOB: 1934  MRN: [id number]  Admit Date:  3/7/2018      Subjective Complaint:   And still short of breath. My chest discomfort is gone. I have difficulty bringing out the phlegm. Physical Examination:     BP (!) 139/48   Pulse 81   Temp 97.9 °F (36.6 °C) (Oral)   Resp 12   Ht 5' 3\" (1.6 m)   Wt 187 lb 4.8 oz (85 kg)   SpO2 100%   BMI 33.18 kg/m²       Intake/Output Summary (Last 24 hours) at 03/18/18 1244  Last data filed at 03/18/18 0401   Gross per 24 hour   Intake              200 ml   Output             1175 ml   Net             -975 ml                  Deyanira J Repko examined at bedside . oriented times 3. Focused exam reveals:     Cardiac: Heart regular rate and rhythm     Lungs:  Distant breath sounds and expiratory wheezes throughout both posterior lung fields. Extremities:   negative    Telemetry:      normal sinus          LABS:   CBC:   Recent Labs      03/17/18   0529   WBC  21.0*   HGB  10.7*   PLT  326      BMP:  Recent Labs      03/17/18   0528   NA  128*   K  3.9   CL  86*   CO2  29   BUN  42*   CREATININE  1.28*   GLUCOSE  168*              Troponin: No results for input(s): TROPONINT in the last 72 hours. BNP: No results for input(s): PROBNP in the last 72 hours. INR: No results for input(s): INR in the last 72 hours. Mg: No results for input(s): MG in the last 72 hours. Cardiac Testing:    none    Assessment:       Reactive airway disease exacerbation,Still with cough that sounds moist, and audible wheezes. Lower anterior chest discomfort with prior negative cardiac workup and negative troponin, see Dr. Oleksandr Wilson notes of 3/16/18. Chest discomfort is chronic, no suspicion for angina pectoris, and has responded to nitrates. Also likely GI in origin. Sinus tachycardia possibly related to acute respiratory issues and aerosol treatment.     Verapamil dose has been

## 2018-03-19 LAB
ANION GAP SERPL CALCULATED.3IONS-SCNC: 13 MEQ/L (ref 7–13)
BUN BLDV-MCNC: 47 MG/DL (ref 8–23)
CALCIUM SERPL-MCNC: 8.2 MG/DL (ref 8.6–10.2)
CHLORIDE BLD-SCNC: 90 MEQ/L (ref 98–107)
CO2: 29 MEQ/L (ref 22–29)
CREAT SERPL-MCNC: 1.41 MG/DL (ref 0.5–0.9)
GFR AFRICAN AMERICAN: 43.1
GFR NON-AFRICAN AMERICAN: 35.6
GLUCOSE BLD-MCNC: 151 MG/DL (ref 74–109)
POTASSIUM SERPL-SCNC: 4.2 MEQ/L (ref 3.5–5.1)
SODIUM BLD-SCNC: 132 MEQ/L (ref 132–144)

## 2018-03-19 PROCEDURE — 2060000000 HC ICU INTERMEDIATE R&B

## 2018-03-19 PROCEDURE — 6360000002 HC RX W HCPCS: Performed by: INTERNAL MEDICINE

## 2018-03-19 PROCEDURE — 94760 N-INVAS EAR/PLS OXIMETRY 1: CPT

## 2018-03-19 PROCEDURE — 6370000000 HC RX 637 (ALT 250 FOR IP): Performed by: INTERNAL MEDICINE

## 2018-03-19 PROCEDURE — 36415 COLL VENOUS BLD VENIPUNCTURE: CPT

## 2018-03-19 PROCEDURE — 99232 SBSQ HOSP IP/OBS MODERATE 35: CPT | Performed by: INTERNAL MEDICINE

## 2018-03-19 PROCEDURE — 80048 BASIC METABOLIC PNL TOTAL CA: CPT

## 2018-03-19 PROCEDURE — 6370000000 HC RX 637 (ALT 250 FOR IP): Performed by: NURSE PRACTITIONER

## 2018-03-19 PROCEDURE — 2580000003 HC RX 258: Performed by: INTERNAL MEDICINE

## 2018-03-19 PROCEDURE — 93010 ELECTROCARDIOGRAM REPORT: CPT | Performed by: INTERNAL MEDICINE

## 2018-03-19 PROCEDURE — 2700000000 HC OXYGEN THERAPY PER DAY

## 2018-03-19 PROCEDURE — 94668 MNPJ CHEST WALL SBSQ: CPT

## 2018-03-19 PROCEDURE — 94640 AIRWAY INHALATION TREATMENT: CPT

## 2018-03-19 RX ORDER — GUAIFENESIN/DEXTROMETHORPHAN 100-10MG/5
5 SYRUP ORAL EVERY 6 HOURS PRN
Status: DISCONTINUED | OUTPATIENT
Start: 2018-03-19 | End: 2018-03-21 | Stop reason: HOSPADM

## 2018-03-19 RX ORDER — IPRATROPIUM BROMIDE AND ALBUTEROL SULFATE 2.5; .5 MG/3ML; MG/3ML
1 SOLUTION RESPIRATORY (INHALATION)
Status: DISCONTINUED | OUTPATIENT
Start: 2018-03-19 | End: 2018-03-21 | Stop reason: HOSPADM

## 2018-03-19 RX ORDER — FUROSEMIDE 10 MG/ML
40 INJECTION INTRAMUSCULAR; INTRAVENOUS ONCE
Status: COMPLETED | OUTPATIENT
Start: 2018-03-19 | End: 2018-03-19

## 2018-03-19 RX ORDER — FUROSEMIDE 10 MG/ML
40 INJECTION INTRAMUSCULAR; INTRAVENOUS ONCE
Status: DISCONTINUED | OUTPATIENT
Start: 2018-03-19 | End: 2018-03-19 | Stop reason: SDUPTHER

## 2018-03-19 RX ADMIN — THEOPHYLLINE ANHYDROUS 400 MG: 300 CAPSULE, EXTENDED RELEASE ORAL at 08:38

## 2018-03-19 RX ADMIN — VALSARTAN 160 MG: 160 TABLET ORAL at 08:39

## 2018-03-19 RX ADMIN — ASPIRIN 81 MG 81 MG: 81 TABLET ORAL at 08:38

## 2018-03-19 RX ADMIN — Medication 10 ML: at 21:21

## 2018-03-19 RX ADMIN — METHYLPREDNISOLONE SODIUM SUCCINATE 40 MG: 40 INJECTION, POWDER, FOR SOLUTION INTRAMUSCULAR; INTRAVENOUS at 02:06

## 2018-03-19 RX ADMIN — IPRATROPIUM BROMIDE AND ALBUTEROL SULFATE 1 AMPULE: .5; 3 SOLUTION RESPIRATORY (INHALATION) at 13:10

## 2018-03-19 RX ADMIN — FUROSEMIDE 40 MG: 10 INJECTION, SOLUTION INTRAVENOUS at 15:42

## 2018-03-19 RX ADMIN — IPRATROPIUM BROMIDE AND ALBUTEROL SULFATE 1 AMPULE: .5; 3 SOLUTION RESPIRATORY (INHALATION) at 07:03

## 2018-03-19 RX ADMIN — ENOXAPARIN SODIUM 40 MG: 40 INJECTION SUBCUTANEOUS at 21:21

## 2018-03-19 RX ADMIN — DOCUSATE SODIUM 100 MG: 100 CAPSULE, LIQUID FILLED ORAL at 08:39

## 2018-03-19 RX ADMIN — IPRATROPIUM BROMIDE AND ALBUTEROL SULFATE 1 AMPULE: .5; 3 SOLUTION RESPIRATORY (INHALATION) at 20:59

## 2018-03-19 RX ADMIN — GUAIFENESIN 600 MG: 600 TABLET, EXTENDED RELEASE ORAL at 21:20

## 2018-03-19 RX ADMIN — VERAPAMIL HYDROCHLORIDE 240 MG: 240 TABLET, FILM COATED, EXTENDED RELEASE ORAL at 21:20

## 2018-03-19 RX ADMIN — MONTELUKAST SODIUM 10 MG: 10 TABLET, FILM COATED ORAL at 21:20

## 2018-03-19 RX ADMIN — METHYLPREDNISOLONE SODIUM SUCCINATE 40 MG: 40 INJECTION, POWDER, FOR SOLUTION INTRAMUSCULAR; INTRAVENOUS at 18:43

## 2018-03-19 RX ADMIN — IPRATROPIUM BROMIDE AND ALBUTEROL SULFATE 1 AMPULE: .5; 3 SOLUTION RESPIRATORY (INHALATION) at 16:33

## 2018-03-19 RX ADMIN — ALPRAZOLAM 0.5 MG: 0.5 TABLET ORAL at 00:12

## 2018-03-19 RX ADMIN — SIMVASTATIN 20 MG: 20 TABLET, FILM COATED ORAL at 21:21

## 2018-03-19 RX ADMIN — NITROGLYCERIN 0.5 INCH: 20 OINTMENT TOPICAL at 00:13

## 2018-03-19 RX ADMIN — NITROGLYCERIN 0.5 INCH: 20 OINTMENT TOPICAL at 06:31

## 2018-03-19 RX ADMIN — Medication 10 ML: at 08:39

## 2018-03-19 RX ADMIN — GUAIFENESIN 600 MG: 600 TABLET, EXTENDED RELEASE ORAL at 08:39

## 2018-03-19 RX ADMIN — METHYLPREDNISOLONE SODIUM SUCCINATE 40 MG: 40 INJECTION, POWDER, FOR SOLUTION INTRAMUSCULAR; INTRAVENOUS at 10:06

## 2018-03-19 RX ADMIN — PANTOPRAZOLE SODIUM 40 MG: 40 TABLET, DELAYED RELEASE ORAL at 06:30

## 2018-03-19 RX ADMIN — VERAPAMIL HYDROCHLORIDE 240 MG: 240 TABLET, FILM COATED, EXTENDED RELEASE ORAL at 08:39

## 2018-03-19 ASSESSMENT — PAIN SCALES - GENERAL
PAINLEVEL_OUTOF10: 0
PAINLEVEL_OUTOF10: 0

## 2018-03-19 NOTE — PROGRESS NOTES
associated with pounding. She also has had chest discomfort in her chest with ambulation. It times radiates to her left arm up. It's usually short-lived and relieved with inactivity. She's not use nitroglycerin. She's had no lower extremity pedal edema.       Denies LH, Dizziness, TIA or CVA Symptoms. No Orthopnea, Edema or CHF symptoms. No Palpitations. No Syncope. No Fever, Chills or Cold symptoms.   No GI,  or Bleeding complaints.     She's currently asymptomatic other than shortness of breath.     Cardiac and general ROS otherwise negative.     14 System ROS otherwise negative other than noted.         OBJECTIVE:     MEDICATIONS:     Scheduled Meds:   nitroglycerin  0.5 inch Topical 4 times per day    pantoprazole  40 mg Oral QAM AC    verapamil  240 mg Oral BID    enoxaparin  40 mg Subcutaneous Daily    ipratropium-albuterol  1 ampule Inhalation TID    theophylline  400 mg Oral Daily    guaiFENesin  600 mg Oral BID    docusate sodium  100 mg Oral BID    simvastatin  20 mg Oral Nightly    sodium chloride flush  10 mL Intravenous 2 times per day    methylPREDNISolone  40 mg Intravenous Q8H    aspirin  81 mg Oral Daily    montelukast  10 mg Oral Daily    valsartan  160 mg Oral Daily     Continuous Infusions:    PRN Meds:calcium carbonate, benzonatate, benzocaine-menthol, ALPRAZolam, ALPRAZolam, sodium chloride flush, albuterol, nitroGLYCERIN, morphine, sodium chloride flush, acetaminophen, magnesium hydroxide, ondansetron    PHYSICAL EXAM:    CURRENT VITALS: BP (!) 128/41   Pulse 78   Temp 97.9 °F (36.6 °C) (Oral)   Resp 18   Ht 5' 3\" (1.6 m)   Wt 187 lb 4.8 oz (85 kg)   SpO2 98%   BMI 33.18 kg/m²     CONSTITUTIONAL:  awake, alert, cooperative, no apparent distress,   ENT:  Normocephalic, without obvious abnormality, atraumatic, sinuses nontender on palpation, external ears without lesions,  NECK:  Supple, symmetrical, trachea midline, no adenopathy, thyroid symmetric, not enlarged

## 2018-03-19 NOTE — PROGRESS NOTES
Department of Internal Medicine  Progress Note      SUBJECTIVE: Patient is very short of breath and wheezing today. Has cough . No fevers or chills. a dn is hemodynamically stable. No acute events overnight.        ROS:  All 12 systems reviewed and negative except mentioned in HPI and Assessment and plan    MEDICATIONS:  Current Facility-Administered Medications   Medication Dose Route Frequency Provider Last Rate Last Dose    ipratropium-albuterol (DUONEB) nebulizer solution 1 ampule  1 ampule Inhalation Q4H WA Smith Nguyen MD        guaiFENesin-dextromethorphan (ROBITUSSIN DM) 100-10 MG/5ML syrup 5 mL  5 mL Oral Q6H PRN Smith Nguyen MD        nitroglycerin (NITRO-BID) 2 % ointment 0.5 inch  0.5 inch Topical 4 times per day Jared Valentino MD   0.5 inch at 03/19/18 0631    pantoprazole (PROTONIX) tablet 40 mg  40 mg Oral QAM AC Jared Valentino MD   40 mg at 03/19/18 0630    calcium carbonate (TUMS) chewable tablet 500 mg  500 mg Oral TID PRN Jared Valentino MD        verapamil (CALAN SR) extended release tablet 240 mg  240 mg Oral BID Bita Maharaj MD   240 mg at 03/19/18 0839    enoxaparin (LOVENOX) injection 40 mg  40 mg Subcutaneous Daily Fany Soler DO   40 mg at 03/18/18 2219    theophylline (REMY-24) extended release capsule 400 mg  400 mg Oral Daily Severo White MD   400 mg at 03/19/18 4873    benzonatate (TESSALON) capsule 100 mg  100 mg Oral TID PRN Smith Nguyen MD   100 mg at 03/14/18 1226    benzocaine-menthol (CEPACOL SORE THROAT) lozenge 1 lozenge  1 lozenge Oral Q2H PRN Smith Arnold MD   1 lozenge at 03/18/18 0905    ALPRAZolam (XANAX) tablet 0.25 mg  0.25 mg Oral TID PRN Severo White MD   0.25 mg at 03/18/18 0356    ALPRAZolam (XANAX) tablet 0.5 mg  0.5 mg Oral Nightly PRN Smith Nguyen MD   0.5 mg at 03/19/18 0012    guaiFENesin (MUCINEX) extended release tablet 600 mg  600 mg Oral BID Smith Nguyen MD   600 mg at 03/19/18 8239    sodium chloride flush 0.9 % injection

## 2018-03-19 NOTE — PROGRESS NOTES
ACCUMULATION OF FLUID IN THE MAXILLARY SINUSES TO SUGGEST ACUTE SINUSITIS. THERE IS NO OBSTRUCTING NASAL CAVITY LESION. THERE IS PERFORATION OF THE POSTERIOR   ASPECT OF THE NASAL SEPTUM, COMPATIBLE WITH PRIOR SURGERY. THERE IS NO DEVIATION OF THE NASAL SEPTUM. ASSESSMENT /Plan:  1.  Acute COPD exacerbation secondary to bronchitis,  will continue with aggressive pulmonary toileting, guaifenesin, heated humidified, vest therapy, bronchodilator therapy and theophylline   2.  Acute respiratory distress ,  stable at rest but severely dyspneic with exertion  3.  Chest pain rule out etiology, likely noncardiac  4.  Hypertension  5.  Anxiety, associated with acute illness and severe dyspnea    Continue bronchodilator therapy. Continue IS and Acapella continue  IV steroid. report of CT sinus noted. Discuss with  and daughter at bedside, give Lasix 40 mg IV 1 dose. Also recommended she start having evaluation for LTAC.         Electronically signed by Driss Barahona MD on 3/19/2018 at 2:52 PM

## 2018-03-20 ENCOUNTER — APPOINTMENT (OUTPATIENT)
Dept: GENERAL RADIOLOGY | Age: 83
DRG: 190 | End: 2018-03-20
Payer: MEDICARE

## 2018-03-20 LAB
ANION GAP SERPL CALCULATED.3IONS-SCNC: 14 MEQ/L (ref 7–13)
BASE EXCESS ARTERIAL: 8 (ref -3–3)
BUN BLDV-MCNC: 56 MG/DL (ref 8–23)
CALCIUM SERPL-MCNC: 8.2 MG/DL (ref 8.6–10.2)
CHLORIDE BLD-SCNC: 90 MEQ/L (ref 98–107)
CO2: 30 MEQ/L (ref 22–29)
CREAT SERPL-MCNC: 1.81 MG/DL (ref 0.5–0.9)
GFR AFRICAN AMERICAN: 32.3
GFR NON-AFRICAN AMERICAN: 26.7
GLUCOSE BLD-MCNC: 193 MG/DL (ref 74–109)
HCO3 ARTERIAL: 32.2 MMOL/L (ref 21–29)
LACTATE: 2.84 MMOL/L (ref 0.4–2)
O2 SAT, ARTERIAL: 97 % (ref 93–100)
PCO2 ARTERIAL: 46 MM HG (ref 35–45)
PERFORMED ON: ABNORMAL
PH ARTERIAL: 7.45 (ref 7.35–7.45)
PO2 ARTERIAL: 92 MM HG (ref 75–108)
POC SAMPLE TYPE: ABNORMAL
POTASSIUM SERPL-SCNC: 4.3 MEQ/L (ref 3.5–5.1)
SODIUM BLD-SCNC: 134 MEQ/L (ref 132–144)
TCO2 ARTERIAL: 34 (ref 22–29)

## 2018-03-20 PROCEDURE — APPSS30 APP SPLIT SHARED TIME 16-30 MINUTES: Performed by: PHYSICIAN ASSISTANT

## 2018-03-20 PROCEDURE — 2580000003 HC RX 258: Performed by: INTERNAL MEDICINE

## 2018-03-20 PROCEDURE — 6370000000 HC RX 637 (ALT 250 FOR IP): Performed by: INTERNAL MEDICINE

## 2018-03-20 PROCEDURE — 2700000000 HC OXYGEN THERAPY PER DAY

## 2018-03-20 PROCEDURE — 2060000000 HC ICU INTERMEDIATE R&B

## 2018-03-20 PROCEDURE — 71045 X-RAY EXAM CHEST 1 VIEW: CPT

## 2018-03-20 PROCEDURE — 36600 WITHDRAWAL OF ARTERIAL BLOOD: CPT

## 2018-03-20 PROCEDURE — 83605 ASSAY OF LACTIC ACID: CPT

## 2018-03-20 PROCEDURE — 99233 SBSQ HOSP IP/OBS HIGH 50: CPT | Performed by: INTERNAL MEDICINE

## 2018-03-20 PROCEDURE — 6370000000 HC RX 637 (ALT 250 FOR IP): Performed by: NURSE PRACTITIONER

## 2018-03-20 PROCEDURE — 94640 AIRWAY INHALATION TREATMENT: CPT

## 2018-03-20 PROCEDURE — 36415 COLL VENOUS BLD VENIPUNCTURE: CPT

## 2018-03-20 PROCEDURE — 82803 BLOOD GASES ANY COMBINATION: CPT

## 2018-03-20 PROCEDURE — 80048 BASIC METABOLIC PNL TOTAL CA: CPT

## 2018-03-20 PROCEDURE — 6360000002 HC RX W HCPCS: Performed by: INTERNAL MEDICINE

## 2018-03-20 RX ADMIN — MONTELUKAST SODIUM 10 MG: 10 TABLET, FILM COATED ORAL at 23:20

## 2018-03-20 RX ADMIN — SIMVASTATIN 20 MG: 20 TABLET, FILM COATED ORAL at 23:20

## 2018-03-20 RX ADMIN — ASPIRIN 81 MG 81 MG: 81 TABLET ORAL at 08:59

## 2018-03-20 RX ADMIN — Medication 10 ML: at 09:00

## 2018-03-20 RX ADMIN — IPRATROPIUM BROMIDE AND ALBUTEROL SULFATE 1 AMPULE: .5; 3 SOLUTION RESPIRATORY (INHALATION) at 14:02

## 2018-03-20 RX ADMIN — NITROGLYCERIN 0.5 INCH: 20 OINTMENT TOPICAL at 17:24

## 2018-03-20 RX ADMIN — DOCUSATE SODIUM 100 MG: 100 CAPSULE, LIQUID FILLED ORAL at 09:00

## 2018-03-20 RX ADMIN — METHYLPREDNISOLONE SODIUM SUCCINATE 40 MG: 40 INJECTION, POWDER, FOR SOLUTION INTRAMUSCULAR; INTRAVENOUS at 01:41

## 2018-03-20 RX ADMIN — ALPRAZOLAM 0.5 MG: 0.5 TABLET ORAL at 00:11

## 2018-03-20 RX ADMIN — IPRATROPIUM BROMIDE AND ALBUTEROL SULFATE 1 AMPULE: .5; 3 SOLUTION RESPIRATORY (INHALATION) at 19:29

## 2018-03-20 RX ADMIN — VERAPAMIL HYDROCHLORIDE 240 MG: 240 TABLET, FILM COATED, EXTENDED RELEASE ORAL at 23:20

## 2018-03-20 RX ADMIN — ALPRAZOLAM 0.5 MG: 0.5 TABLET ORAL at 23:20

## 2018-03-20 RX ADMIN — NITROGLYCERIN 0.5 INCH: 20 OINTMENT TOPICAL at 12:42

## 2018-03-20 RX ADMIN — GUAIFENESIN 600 MG: 600 TABLET, EXTENDED RELEASE ORAL at 08:59

## 2018-03-20 RX ADMIN — METHYLPREDNISOLONE SODIUM SUCCINATE 40 MG: 40 INJECTION, POWDER, FOR SOLUTION INTRAMUSCULAR; INTRAVENOUS at 17:24

## 2018-03-20 RX ADMIN — NITROGLYCERIN 0.5 INCH: 20 OINTMENT TOPICAL at 00:13

## 2018-03-20 RX ADMIN — THEOPHYLLINE ANHYDROUS 400 MG: 300 CAPSULE, EXTENDED RELEASE ORAL at 08:59

## 2018-03-20 RX ADMIN — IPRATROPIUM BROMIDE AND ALBUTEROL SULFATE 1 AMPULE: .5; 3 SOLUTION RESPIRATORY (INHALATION) at 06:09

## 2018-03-20 RX ADMIN — GUAIFENESIN 600 MG: 600 TABLET, EXTENDED RELEASE ORAL at 23:20

## 2018-03-20 RX ADMIN — IPRATROPIUM BROMIDE AND ALBUTEROL SULFATE 1 AMPULE: .5; 3 SOLUTION RESPIRATORY (INHALATION) at 10:44

## 2018-03-20 RX ADMIN — VALSARTAN 160 MG: 160 TABLET ORAL at 08:59

## 2018-03-20 RX ADMIN — ENOXAPARIN SODIUM 40 MG: 40 INJECTION SUBCUTANEOUS at 23:20

## 2018-03-20 RX ADMIN — PANTOPRAZOLE SODIUM 40 MG: 40 TABLET, DELAYED RELEASE ORAL at 06:05

## 2018-03-20 RX ADMIN — NITROGLYCERIN 0.5 INCH: 20 OINTMENT TOPICAL at 06:05

## 2018-03-20 RX ADMIN — VERAPAMIL HYDROCHLORIDE 240 MG: 240 TABLET, FILM COATED, EXTENDED RELEASE ORAL at 08:59

## 2018-03-20 RX ADMIN — METHYLPREDNISOLONE SODIUM SUCCINATE 40 MG: 40 INJECTION, POWDER, FOR SOLUTION INTRAMUSCULAR; INTRAVENOUS at 09:02

## 2018-03-20 ASSESSMENT — PAIN SCALES - GENERAL
PAINLEVEL_OUTOF10: 0

## 2018-03-20 NOTE — PROGRESS NOTES
CARDIOLOGY [address] PROGRESS NOTE         3/20/2018      Codi Galarza    [id number]  1934    Rounding MD: Jovita Cline MD ,[facility]    Primary Cardiologist: Luna Lovell MD     Requesting Physician:  Rickey Recinos DO     Reason for Initial Consult:  Tachycardia, Chest Pain     SUBJECTIVE:     Patient less sob and wheezing, but no CP. Better today. Stress test and LV Function normal.     Home Medications reviewed with nurse. Cardiac and general ROS otherwise negative and unchanged.     Assessment:     1. Chest Pain, typical , atypical, Resolved  2. Tachycardia, Better with Rx.  3. COPD Exacerbation  4. Negative Cardiac Cath 2009,  5. Negative Myoview Stress 2015 and 3/18  6. Normal LV Function, LVEF 60%  7. HTN  8. HLP  9. Anemia, Mild  10. Renal Insufficiency  11. DJD  12. Lumbar Spinal Stenosis , Post Laminectomy  13. Past Tobacco use  14. Overweight  15. Negative CTA CHEST FOR PE, 3/18     Plan:     1. Cardiac Supportive Care  2. Increase Verapamil to 240 BID PO  3. Telemetry monitoring  4. Pulmonary maximal treatment   5. Once Stable, OK ot DC home From CV point if ok with others. 6. Will Follow with you. 7. See Orders           HISTORY OF PRESENT ILLNESS:      Codi Galarza is a pleasant 80 y.o. female who presented to [facility] emergency room with worsening shortness of breath and chest pain symptoms. Patient was found to have tachycardia and COPD exacerbation. She has the above-noted past and current history. Cardiology was asked to see regarding treatment and evaluation. .     Patient Follows with Dr Tracy Roberts.     Patient History and Records, EMR reviewed. Patient Interviewed and examined.     She states that she's been doing well overall. But she does have worsening shortness of breath she does wear oxygen at home. She at night has had chest discomfort associated with pounding.   She also has had chest discomfort in her chest bruits. LUNGS:  Increased work of breathing, decreased air exchange. Bilateral wheezing  CARDIOVASCULAR:  Normal apical impulse, regular rate and rhythm, normal S1 and S2,  2/6 Systolic murmur noted. ABDOMEN:  Obese, normal bowel sounds, soft, non-distended, non-tender, no masses palpated, no hepatosplenomegally  EXTREMETIES: No edema, Pulses Strong Thruout. No ulcers. NEUROLOGIC:  Awake, alert, oriented to name, place and time. Following all commands and moving all extremties. SKIN:  no bruising or bleeding, normal skin color, texture, turgor and no rashes     Data:        LABS:      Recent Results (from the past 24 hour(s))   POCT Arterial    Collection Time: 03/20/18 10:44 AM   Result Value Ref Range    pH, Arterial 7.449 7.350 - 7.450    pCO2, Arterial 46 (H) 35 - 45 mm Hg    pO2, Arterial 92 (HH) 75 - 108 mm Hg    HCO3, Arterial 32.2 (H) 21.0 - 29.0 mmol/L    Base Excess, Arterial 8 (H) -3 - 3    O2 Sat, Arterial 97 (HH) 93 - 100 %    TCO2, Arterial 34 (H) 22 - 29    Lactate 2.84 (H) 0.40 - 2.00 mmol/L    Sample Type ART     Performed on SEE BELOW        CTA CHEST:  1.  NO EVIDENCE OF CENTRAL OR SEGMENTAL PULMONARY EMBOLISM. 2.  MILD TO MODERATE EMPHYSEMATOUS DISEASE. 3.  SMALL HIATAL HERNIA.   4.  PARTIALLY VISUALIZED PERINEPHRIC STRANDING LEFT KIDNEY AND PARTIALLY VISUALIZED PROBABLE RENAL CYST.    CXR:  NAD     ECG:                 SR,  60 rate    LEXISCAN MYOVIEW:  Negative for Ischemia  LVEF 86%          Sofiya Dickens MD ,[address] [place] [address] Cardiology

## 2018-03-20 NOTE — PROGRESS NOTES
INPATIENT PROGRESS NOTES    PATIENT NAME: Maryanne Upton  MRN: [id number]  SERVICE DATE:  March 20, 2018   SERVICE TIME:  10:33 AM      PRIMARY SERVICE:  Pulmonary Medicine     INTERVAL HPI: Patient seen and examined at bedside, Interval Notes, orders reviewed. Nursing notes noted: Patient appears sleepy today but does respond to questions appropriately. She still has SOB on exertion. Patient has been refusing treatments and recommendations including chest vest therapy,  BiPAP, and ENT evaluation for vocal cord dysfunction. Patient also refised hospice evaluation yesterday. Patient has one dose of IV lasix 40 mg yesterday per Dr. Yocasta Pope and diureses a small amount but still with significant pitting edema bilaterally. Patient had a CTA on 3/12 and this was negative for PE. Patient denies any nausea, vomiting diarrhea or abdominal pain. Patient is on 3 L of oxygen via nasal cannula and is oxygenating well. Patient's BUN and creatine are gradually trending up from 1.28-1.4 and BUN from 42-47, MAP is currently 75. STAT ABG's and CXR were completed due to persistent dyspnea. ABG's showed a pH of 7.449, a pCO2 of 46, a pO2 of 92 and a HCO3 of 32.2. CXR report still pending but upon review does not show any significant change from prior. Review of Systems  Please see HPI above. OBJECTIVE    Body mass index is 32.77 kg/m². PHYSICAL EXAM:  Vitals:  BP (!) 139/43   Pulse 89   Temp 98.2 °F (36.8 °C) (Oral)   Resp 18   Ht 5' 3\" (1.6 m)   Wt 185 lb (83.9 kg)   SpO2 99%   BMI 32.77 kg/m²   General: Patient is sitting up right in a chair, appears a bit lethargic  Head: Atraumatic , Normocephalic   Eyes: PERRL. No sclera icterus. No conjunctival injection. No discharge   ENT: No nasal  discharge. Pharynx clear. Neck:  Trachea midline. No thyromegaly, no JVD, No cervical adenopathy. Resp : Diminished breath sounds bilaterally with bibasilar rales at the bases. Wheezes mostly in the upper airway  CV: Normal  rate. cardiac silhouette is of normal size configuration. The mediastinum is unremarkable. Pulmonary vascular unremarkable. Right sided trachea. No focal infiltrates. No Pneumothoraces. NO ACUTE ACTIVE CARDIOPULMONARY PROCESS    Ct Sinus Wo Contrast    Result Date: 3/16/2018  EXAMINATION:  CT SINUS WO CONTRAST CLINICAL HISTORY:   sinusitis COMPARISONS:  NONE AVAILABLE TECHNIQUE:  Spiral axial images of the maxillofacial region were obtained, specific attention of the paranasal sinuses. Multiplanar two-dimensional reformatting was performed. FINDINGS:  There is an approximately 1.9 cm convex soft tissue structure in the dependent portion of the right maxillary sinus. There is 2 immediately adjacent approximately 8mm diameter soft tissue rounded structures in the dependent portion of the left  maxillary sinus. There is no fluid accumulating in paranasal sinuses. Ethmoid frontal and sphenoid sinuses are unremarkable. The nasal septum is midline. The posterior portion of the nasal septum is perforated and partially resected, compatible with prior surgery. Nasal turbinates are unremarkable. INFLAMMATORY POLYPS VERSUS MUCOUS RETENTION CYSTS IN THE MAXILLARY SINUSES WITHOUT ACCUMULATION OF FLUID IN THE MAXILLARY SINUSES TO SUGGEST ACUTE SINUSITIS. THERE IS NO OBSTRUCTING NASAL CAVITY LESION. THERE IS PERFORATION OF THE POSTERIOR ASPECT OF THE NASAL SEPTUM, COMPATIBLE WITH PRIOR SURGERY. THERE IS NO DEVIATION OF THE NASAL SEPTUM. All CT scans at this facility use dose modulation, iterative reconstruction, and/or weight based dosing when appropriate to reduce radiation dose to as low as reasonably achievable.       ASSESSMENT /Plan:  1.  Acute COPD exacerbation secondary to bronchitis,  will continue with aggressive pulmonary toileting, guaifenesin, heated humidified, vest therapy, bronchodilator therapy and theophylline   2.  Acute respiratory

## 2018-03-20 NOTE — PROGRESS NOTES
Physical Therapy Missed Treatment   Facility/Department: [facility] MED SURG [id number]    NAME: Kayley Brock    : 1934 (80 y.o.)  MRN: [id number]    Account: [de-identified]  Gender: female        [x] Patient Declines PT Treatment: due to fatigue, up in chair earlier today             Will attempt PT treatment again at earliest convenience.         Electronically signed by Beata Pastor PTA on 3/20/18 at 3:44 PM

## 2018-03-20 NOTE — PROGRESS NOTES
Department of Internal Medicine  Progress Note      SUBJECTIVE: Persistent wheezing. Patient is lethargic today  No acute events overnight.        ROS:  All 12 systems reviewed and negative except mentioned in HPI and Assessment and plan    MEDICATIONS:  Current Facility-Administered Medications   Medication Dose Route Frequency Provider Last Rate Last Dose    ipratropium-albuterol (DUONEB) nebulizer solution 1 ampule  1 ampule Inhalation Q4H WA Smith Nguyen MD   1 ampule at 03/20/18 1402    guaiFENesin-dextromethorphan (ROBITUSSIN DM) 100-10 MG/5ML syrup 5 mL  5 mL Oral Q6H PRN Smith Alfaro MD        nitroglycerin (NITRO-BID) 2 % ointment 0.5 inch  0.5 inch Topical 4 times per day Pooja Church MD   0.5 inch at 03/20/18 1242    pantoprazole (PROTONIX) tablet 40 mg  40 mg Oral QAM AC Pooja Church MD   40 mg at 03/20/18 9461    calcium carbonate (TUMS) chewable tablet 500 mg  500 mg Oral TID PRN Pooja Church MD        verapamil (CALAN SR) extended release tablet 240 mg  240 mg Oral BID Brooke Medley MD   240 mg at 03/20/18 0859    enoxaparin (LOVENOX) injection 40 mg  40 mg Subcutaneous Daily [facility], DO   40 mg at 03/19/18 2121    theophylline (REMY-24) extended release capsule 400 mg  400 mg Oral Daily Serenity Aiken MD   400 mg at 03/20/18 0859    benzonatate (TESSALON) capsule 100 mg  100 mg Oral TID PRN Smith Alfaro MD   100 mg at 03/14/18 1226    benzocaine-menthol (CEPACOL SORE THROAT) lozenge 1 lozenge  1 lozenge Oral Q2H PRN Smith Alfaro MD   1 lozenge at 03/18/18 0905    ALPRAZolam (XANAX) tablet 0.25 mg  0.25 mg Oral TID PRN Serenity Aiken MD   0.25 mg at 03/18/18 0356    ALPRAZolam (XANAX) tablet 0.5 mg  0.5 mg Oral Nightly PRN Smith Nguyen MD   0.5 mg at 03/20/18 0011    guaiFENesin [facility]) extended release tablet 600 mg  600 mg Oral BID Smith Alfaro MD   600 mg at 03/20/18 0859    sodium chloride flush 0.9 % injection 10 mL  10 mL Intravenous PRN Meng H CTAB except bilateral basilar crackles. CARDIOVASCULAR:  S1S2 present  ABDOMEN:  soft, non-distended, non-tender  MUSCULOSKELETAL:  There is no redness, warmth, or swelling of the joints. NEUROLOGIC:  Alert, awake, oriented x 3. No FND  EXTREMITIES: Warm and well perfused. LABS  No results for input(s): WBC, RBC, HGB, HCT, MCV, MCH, MCHC, RDW, PLT, MPV in the last 72 hours. Recent Labs      03/19/18   0512  03/20/18   1346   NA  132  134   K  4.2  4.3   CL  90*  90*   CO2  29  30*   BUN  47*  56*   CREATININE  1.41*  1.81*   GLUCOSE  151*  193*   CALCIUM  8.2*  8.2*       No results for input(s): MG in the last 72 hours. ASSESSMENT AND PLAN    Active Hospital Problems    Diagnosis Date Noted    Respiratory distress [R06.00]     RO (acute kidney injury) ([facility] [N17.9] 03/14/2018    COPD exacerbation ([facility] [J44.1] 03/07/2018    History of tobacco abuse [Z87.891] 03/01/2013    Hypertension [I10]      - RO; worse after receiving lasix yesterday. Repeat BMP tomorrow  -Patient continues to have wheezing and cough. She is on round the clock breathing treatment, VEST therapy, IS, acapella, theophylline and steroids as per resp. She also completed 8 days of antibiotics. Her respiratory status has failed to improved despite all of above treatments. She was recommended bronch but the patient and family refused it. She was seen by ENT for chronic cough and recommended laryngoscopy which she refused. She was very lethargic this morning and had ABG and CXR done which was normal. Patient has been accepted at [facility]  Will discuss with Dr Bernadette Molina regarding further plan  - HTN: stable on current meds.  Verapamil increased by cardio  Will d/c tomorrow  On Lovenox for DVT prophyalxis  Seleta Carrel, MD  Pager : [phone]

## 2018-03-21 ENCOUNTER — TELEPHONE (OUTPATIENT)
Dept: PULMONOLOGY | Age: 83
End: 2018-03-21

## 2018-03-21 VITALS
RESPIRATION RATE: 20 BRPM | DIASTOLIC BLOOD PRESSURE: 39 MMHG | HEIGHT: 63 IN | TEMPERATURE: 98.3 F | OXYGEN SATURATION: 97 % | BODY MASS INDEX: 32.78 KG/M2 | SYSTOLIC BLOOD PRESSURE: 142 MMHG | HEART RATE: 87 BPM | WEIGHT: 185 LBS

## 2018-03-21 PROCEDURE — 6370000000 HC RX 637 (ALT 250 FOR IP): Performed by: INTERNAL MEDICINE

## 2018-03-21 PROCEDURE — 94640 AIRWAY INHALATION TREATMENT: CPT

## 2018-03-21 PROCEDURE — 6370000000 HC RX 637 (ALT 250 FOR IP): Performed by: NURSE PRACTITIONER

## 2018-03-21 PROCEDURE — 2580000003 HC RX 258: Performed by: INTERNAL MEDICINE

## 2018-03-21 PROCEDURE — 6360000002 HC RX W HCPCS: Performed by: INTERNAL MEDICINE

## 2018-03-21 PROCEDURE — 99232 SBSQ HOSP IP/OBS MODERATE 35: CPT | Performed by: INTERNAL MEDICINE

## 2018-03-21 PROCEDURE — 2700000000 HC OXYGEN THERAPY PER DAY

## 2018-03-21 RX ORDER — CALCIUM CARBONATE 200(500)MG
500 TABLET,CHEWABLE ORAL 3 TIMES DAILY PRN
DISCHARGE
Start: 2018-03-21 | End: 2018-04-20

## 2018-03-21 RX ORDER — ALPRAZOLAM 0.25 MG/1
0.25 TABLET ORAL 3 TIMES DAILY PRN
Status: SHIPPED | OUTPATIENT
Start: 2018-03-21 | End: 2018-04-20

## 2018-03-21 RX ORDER — METHYLPREDNISOLONE SODIUM SUCCINATE 40 MG/ML
40 INJECTION, POWDER, LYOPHILIZED, FOR SOLUTION INTRAMUSCULAR; INTRAVENOUS EVERY 12 HOURS
Qty: 240 MG | Refills: 0 | Status: SHIPPED | OUTPATIENT
Start: 2018-03-21 | End: 2018-03-24

## 2018-03-21 RX ORDER — BENZONATATE 100 MG/1
100 CAPSULE ORAL 3 TIMES DAILY PRN
DISCHARGE
Start: 2018-03-21 | End: 2018-03-28

## 2018-03-21 RX ORDER — SIMVASTATIN 20 MG
20 TABLET ORAL NIGHTLY
Qty: 30 TABLET | Refills: 3 | Status: SHIPPED | OUTPATIENT
Start: 2018-03-21 | End: 2018-05-15

## 2018-03-21 RX ORDER — PANTOPRAZOLE SODIUM 40 MG/1
40 TABLET, DELAYED RELEASE ORAL
Qty: 30 TABLET | Refills: 3 | Status: SHIPPED | OUTPATIENT
Start: 2018-03-22 | End: 2018-05-15

## 2018-03-21 RX ORDER — VERAPAMIL HYDROCHLORIDE 240 MG/1
240 TABLET, FILM COATED, EXTENDED RELEASE ORAL 2 TIMES DAILY
Qty: 30 TABLET | Refills: 3 | Status: SHIPPED | OUTPATIENT
Start: 2018-03-21 | End: 2018-05-15

## 2018-03-21 RX ORDER — VALSARTAN 160 MG/1
160 TABLET ORAL DAILY
Qty: 30 TABLET | Refills: 3 | Status: SHIPPED | OUTPATIENT
Start: 2018-03-22 | End: 2018-05-15

## 2018-03-21 RX ORDER — GUAIFENESIN/DEXTROMETHORPHAN 100-10MG/5
5 SYRUP ORAL EVERY 6 HOURS PRN
Qty: 120 ML | DISCHARGE
Start: 2018-03-21 | End: 2018-03-31

## 2018-03-21 RX ORDER — PSEUDOEPHEDRINE HCL 30 MG
100 TABLET ORAL 2 TIMES DAILY
DISCHARGE
Start: 2018-03-21 | End: 2018-05-15

## 2018-03-21 RX ORDER — GUAIFENESIN 600 MG/1
600 TABLET, EXTENDED RELEASE ORAL 2 TIMES DAILY
DISCHARGE
Start: 2018-03-21 | End: 2018-05-15

## 2018-03-21 RX ADMIN — ALBUTEROL SULFATE 2.5 MG: 2.5 SOLUTION RESPIRATORY (INHALATION) at 00:37

## 2018-03-21 RX ADMIN — NITROGLYCERIN 0.5 INCH: 20 OINTMENT TOPICAL at 11:39

## 2018-03-21 RX ADMIN — ALBUTEROL SULFATE 2.5 MG: 2.5 SOLUTION RESPIRATORY (INHALATION) at 05:25

## 2018-03-21 RX ADMIN — NITROGLYCERIN 0.5 INCH: 20 OINTMENT TOPICAL at 05:58

## 2018-03-21 RX ADMIN — IPRATROPIUM BROMIDE AND ALBUTEROL SULFATE 1 AMPULE: .5; 3 SOLUTION RESPIRATORY (INHALATION) at 07:36

## 2018-03-21 RX ADMIN — THEOPHYLLINE ANHYDROUS 400 MG: 300 CAPSULE, EXTENDED RELEASE ORAL at 08:02

## 2018-03-21 RX ADMIN — PANTOPRAZOLE SODIUM 40 MG: 40 TABLET, DELAYED RELEASE ORAL at 05:58

## 2018-03-21 RX ADMIN — Medication 10 ML: at 08:06

## 2018-03-21 RX ADMIN — GUAIFENESIN 600 MG: 600 TABLET, EXTENDED RELEASE ORAL at 08:04

## 2018-03-21 RX ADMIN — METHYLPREDNISOLONE SODIUM SUCCINATE 40 MG: 40 INJECTION, POWDER, FOR SOLUTION INTRAMUSCULAR; INTRAVENOUS at 09:26

## 2018-03-21 RX ADMIN — ASPIRIN 81 MG 81 MG: 81 TABLET ORAL at 08:02

## 2018-03-21 RX ADMIN — VALSARTAN 160 MG: 160 TABLET ORAL at 08:03

## 2018-03-21 RX ADMIN — IPRATROPIUM BROMIDE AND ALBUTEROL SULFATE 1 AMPULE: .5; 3 SOLUTION RESPIRATORY (INHALATION) at 11:18

## 2018-03-21 RX ADMIN — VERAPAMIL HYDROCHLORIDE 240 MG: 240 TABLET, FILM COATED, EXTENDED RELEASE ORAL at 08:01

## 2018-03-21 RX ADMIN — IPRATROPIUM BROMIDE AND ALBUTEROL SULFATE 1 AMPULE: .5; 3 SOLUTION RESPIRATORY (INHALATION) at 14:52

## 2018-03-21 ASSESSMENT — PAIN SCALES - GENERAL
PAINLEVEL_OUTOF10: 0

## 2018-03-21 NOTE — TELEPHONE ENCOUNTER
Theophylline capsule is not covered, however the ER tablet is.  Pharmacy calling to cange from capsule to tablet     Please send ne RX to [facility] [phone] if acceptable

## 2018-03-21 NOTE — DISCHARGE SUMMARY
Physician Discharge Summary     Patient ID:  Wilhelminia Gosselin  [id number]  51 y.o.  1934    Admit date: 3/7/2018    Discharge date and time: 3/21/2018  5:31 PM    Admitting Physician: Tirso Mcelroy MD     Discharge Physician: Tirso Mcelroy MD    Admission Diagnoses: COPD exacerbation [facility]) [J44.1]    Discharge Diagnoses: Active Hospital Problems    Diagnosis Date Noted    Respiratory distress [R06.00]     RO (acute kidney injury) ([facility] [N17.9] 03/14/2018    [facility] exacerb[facility] [J44.1] 03/07/2018    History of tobacco abuse [Z87.891] 03/01/2013    Hypertension [I10]        Admission Condition: fair    Discharged Condition: good    Indication for Admission: shortness of rbeath    Hospital Course: Patient was admitted and treated for above conditions. The patient was discharged in stable condition    Inpatient meds:  enoxaparin, 30 mg, Subcutaneous, Daily    ipratropium-albuterol, 1 ampule, Inhalation, Q4H WA    nitroglycerin, 0.5 inch, Topical, 4 times per day    pantoprazole, 40 mg, Oral, QAM AC    verapamil, 240 mg, Oral, BID    theophylline, 400 mg, Oral, Daily    guaiFENesin, 600 mg, Oral, BID    docusate sodium, 100 mg, Oral, BID    simvastatin, 20 mg, Oral, Nightly    sodium chloride flush, 10 mL, Intravenous, 2 times per day    methylPREDNISolone, 40 mg, Intravenous, Q8H    aspirin, 81 mg, Oral, Daily    montelukast, 10 mg, Oral, Daily    valsartan, 160 mg, Oral, Daily    Imaging: Xr Chest Portable    Result Date: 3/7/2018  EXAMINATION: CHEST PORTABLE VIEW  CLINICAL HISTORY: Short of breath COMPARISONS: July 1, 2017  FINDINGS: Single  views of the chest is submitted. The cardiac silhouette is of normal size configuration. The mediastinum is unremarkable. Pulmonary vascular unremarkable. Right sided trachea. No focal infiltrates. No Pneumothoraces.                                                                                    NO ACUTE ACTIVE CARDIOPULMONARY hydroxide 400 MG/5ML suspension     Information about where to get these medications is not yet available    Ask your nurse or doctor about these medications  · benzocaine-menthol 15-3.6 MG lozenge  · benzonatate 100 MG capsule  · calcium carbonate 500 MG chewable tablet  · docusate 100 MG Caps  · guaiFENesin 600 MG extended release tablet  · guaiFENesin-dextromethorphan 100-10 MG/5ML syrup           Patient Instructions:   Discharge Medication List as of 3/21/2018  3:44 PM      START taking these medications    Details   theophylline (REMY-24) 400 MG extended release capsule Take 1 capsule by mouth daily, Disp-30 capsule, R-3Normal      methylPREDNISolone sodium (SOLU-MEDROL) 40 MG injection Infuse 1 mL intravenously every 12 hours for 3 days, Disp-240 mg, R-0Normal      verapamil (CALAN SR) 240 MG extended release tablet Take 1 tablet by mouth 2 times daily, Disp-30 tablet, R-3Normal      pantoprazole (PROTONIX) 40 MG tablet Take 1 tablet by mouth every morning (before breakfast), Disp-30 tablet, R-3Normal      calcium carbonate (TUMS) 500 MG chewable tablet Take 1 tablet by mouth 3 times daily as needed for [facility] to [facility]      benzonatate (TESSALON) 100 MG capsule Take 1 capsule by mouth 3 times daily as needed for CoughDC to [facility]      guaiFENesin (MUCINEX) 600 MG extended release tablet Take 1 tablet by mouth 2 times dailyDC to SNF      guaiFENesin-dextromethorphan (ROBITUSSIN DM) 100-10 MG/5ML syrup Take 5 mLs by mouth every 6 hours as needed for Cough, Disp-120 mLDC to SNF      docusate sodium (COLACE, DULCOLAX) 100 MG CAPS Take 100 mg by mouth 2 times dailyDC to SNF      magnesium hydroxide (MILK OF MAGNESIA) 400 MG/5ML suspension Take 30 mLs by mouth daily as needed for ConstipationOTC      benzocaine-menthol (CEPACOL SORE THROAT) 15-3.6 MG lozenge Take 1 lozenge by mouth every 2 hours as needed for Sore Throat, Disp-168 lozengeDC to [facility]      ALPRAZolam (XANAX) 0.25 MG tablet Take 1 tablet by mouth 3

## 2018-03-21 NOTE — FLOWSHEET NOTE
Report called to Ailyn Heredia at [facility] Specialty. Pt scheduled for [facility]  approx 1630.  Electronically signed by Tracy Sylvester RN on 3/21/2018 at 3:21 PM

## 2018-03-21 NOTE — DISCHARGE INSTR - DIET

## 2018-03-21 NOTE — PROGRESS NOTES
Increased work of breathing, decreased air exchange. Bilateral wheezing  CARDIOVASCULAR:  Normal apical impulse, regular rate and rhythm, normal S1 and S2,  2/6 Systolic murmur noted. ABDOMEN:  Obese, normal bowel sounds, soft, non-distended, non-tender, no masses palpated, no hepatosplenomegally  EXTREMETIES: No edema, Pulses Strong Thruout. No ulcers. NEUROLOGIC:  Awake, alert, oriented to name, place and time. Following all commands and moving all extremties. SKIN:  no bruising or bleeding, normal skin color, texture, turgor and no rashes     Data:        LABS:      Recent Results (from the past 24 hour(s))   Basic Metabolic Panel    Collection Time: 03/20/18  1:46 PM   Result Value Ref Range    Sodium 134 132 - 144 mEq/L    Potassium 4.3 3.5 - 5.1 mEq/L    Chloride 90 (L) 98 - 107 mEq/L    CO2 30 (H) 22 - 29 mEq/L    Anion Gap 14 (H) 7 - 13 mEq/L    Glucose 193 (H) 74 - 109 mg/dL    BUN 56 (H) 8 - 23 mg/dL    CREATININE 1.81 (H) 0.50 - 0.90 mg/dL    GFR Non-African American 26.7 (L) >60    GFR  32.3 (L) >60    Calcium 8.2 (L) 8.6 - 10.2 mg/dL       CTA CHEST:  1.  NO EVIDENCE OF CENTRAL OR SEGMENTAL PULMONARY EMBOLISM. 2.  MILD TO MODERATE EMPHYSEMATOUS DISEASE. 3.  SMALL HIATAL HERNIA.   4.  PARTIALLY VISUALIZED PERINEPHRIC STRANDING LEFT KIDNEY AND PARTIALLY VISUALIZED PROBABLE RENAL CYST.    CXR:  NAD     ECG:                 SR,  60 rate    LEXISCAN MYOVIEW:  Negative for Ischemia  LVEF 86%          Torres Ferrara MD ,[address] [place] [address] Cardiology

## 2018-03-21 NOTE — PROGRESS NOTES
Renal Adjustment Per Protocol: Lovenox 40mg daily changed to lovenox 30mg daily based on    Recent Labs      03/20/18   1346   CREATININE  1.81*    Estimated Creatinine Clearance: 24 mL/min (A) (based on SCr of 1.81 mg/dL (H)).   Abigail Chen  [facility]

## 2018-03-22 NOTE — PROGRESS NOTES
Physical Therapy  Facility/Department: [facility] MED SURG [id number]  Physical Therapy Discharge      NAME: Cristian Mclean    : 1934 (80 y.o.)  MRN: [id number]    Account: [de-identified]  Gender: female      Patient has been discharged from acute care hospital. DC patient from current PT program.      Electronically signed by Nahomy Padilla PT on 3/22/18 at 4:32 PM

## 2018-04-13 ENCOUNTER — CARE COORDINATION (OUTPATIENT)
Dept: CASE MANAGEMENT | Age: 83
End: 2018-04-13

## 2018-05-15 ENCOUNTER — OFFICE VISIT (OUTPATIENT)
Dept: FAMILY MEDICINE CLINIC | Age: 83
End: 2018-05-15
Payer: MEDICARE

## 2018-05-15 VITALS
HEART RATE: 97 BPM | SYSTOLIC BLOOD PRESSURE: 138 MMHG | DIASTOLIC BLOOD PRESSURE: 82 MMHG | OXYGEN SATURATION: 95 % | HEIGHT: 63 IN

## 2018-05-15 DIAGNOSIS — J44.9 CHRONIC OBSTRUCTIVE PULMONARY DISEASE, UNSPECIFIED COPD TYPE (HCC): ICD-10-CM

## 2018-05-15 DIAGNOSIS — J44.9 CHRONIC OBSTRUCTIVE PULMONARY DISEASE, UNSPECIFIED COPD TYPE (HCC): Primary | ICD-10-CM

## 2018-05-15 DIAGNOSIS — Z51.5 HOSPICE CARE PATIENT: ICD-10-CM

## 2018-05-15 DIAGNOSIS — N17.9 AKI (ACUTE KIDNEY INJURY) (HCC): ICD-10-CM

## 2018-05-15 DIAGNOSIS — H93.8X1 PLUGGED FEELING IN EAR, RIGHT: ICD-10-CM

## 2018-05-15 DIAGNOSIS — H69.81 EUSTACHIAN TUBE DYSFUNCTION, RIGHT: ICD-10-CM

## 2018-05-15 LAB
ANION GAP SERPL CALCULATED.3IONS-SCNC: 13 MEQ/L (ref 7–13)
BUN BLDV-MCNC: 11 MG/DL (ref 8–23)
CALCIUM SERPL-MCNC: 9.3 MG/DL (ref 8.6–10.2)
CHLORIDE BLD-SCNC: 100 MEQ/L (ref 98–107)
CO2: 28 MEQ/L (ref 22–29)
CREAT SERPL-MCNC: 0.71 MG/DL (ref 0.5–0.9)
GFR AFRICAN AMERICAN: >60
GFR NON-AFRICAN AMERICAN: >60
GLUCOSE BLD-MCNC: 143 MG/DL (ref 74–109)
HCT VFR BLD CALC: 31.9 % (ref 37–47)
HEMOGLOBIN: 10.5 G/DL (ref 12–16)
MCH RBC QN AUTO: 32.2 PG (ref 27–31.3)
MCHC RBC AUTO-ENTMCNC: 32.9 % (ref 33–37)
MCV RBC AUTO: 97.8 FL (ref 82–100)
PDW BLD-RTO: 17.9 % (ref 11.5–14.5)
PLATELET # BLD: 270 K/UL (ref 130–400)
POTASSIUM SERPL-SCNC: 4.3 MEQ/L (ref 3.5–5.1)
RBC # BLD: 3.26 M/UL (ref 4.2–5.4)
SODIUM BLD-SCNC: 141 MEQ/L (ref 132–144)
WBC # BLD: 15 K/UL (ref 4.8–10.8)

## 2018-05-15 PROCEDURE — 1036F TOBACCO NON-USER: CPT | Performed by: FAMILY MEDICINE

## 2018-05-15 PROCEDURE — G8417 CALC BMI ABV UP PARAM F/U: HCPCS | Performed by: FAMILY MEDICINE

## 2018-05-15 PROCEDURE — 1090F PRES/ABSN URINE INCON ASSESS: CPT | Performed by: FAMILY MEDICINE

## 2018-05-15 PROCEDURE — G8399 PT W/DXA RESULTS DOCUMENT: HCPCS | Performed by: FAMILY MEDICINE

## 2018-05-15 PROCEDURE — 3023F SPIROM DOC REV: CPT | Performed by: FAMILY MEDICINE

## 2018-05-15 PROCEDURE — G8427 DOCREV CUR MEDS BY ELIG CLIN: HCPCS | Performed by: FAMILY MEDICINE

## 2018-05-15 PROCEDURE — G8598 ASA/ANTIPLAT THER USED: HCPCS | Performed by: FAMILY MEDICINE

## 2018-05-15 PROCEDURE — G8926 SPIRO NO PERF OR DOC: HCPCS | Performed by: FAMILY MEDICINE

## 2018-05-15 PROCEDURE — 99213 OFFICE O/P EST LOW 20 MIN: CPT | Performed by: FAMILY MEDICINE

## 2018-05-15 PROCEDURE — 4040F PNEUMOC VAC/ADMIN/RCVD: CPT | Performed by: FAMILY MEDICINE

## 2018-05-15 PROCEDURE — 1123F ACP DISCUSS/DSCN MKR DOCD: CPT | Performed by: FAMILY MEDICINE

## 2018-05-15 RX ORDER — MONTELUKAST SODIUM 10 MG/1
TABLET ORAL
Qty: 30 TABLET | Refills: 5 | Status: SHIPPED | OUTPATIENT
Start: 2018-05-15 | End: 2018-05-15 | Stop reason: SDUPTHER

## 2018-05-15 RX ORDER — VERAPAMIL HYDROCHLORIDE 120 MG/1
TABLET, FILM COATED ORAL
Qty: 30 TABLET | Refills: 5 | COMMUNITY
Start: 2018-05-15

## 2018-05-15 RX ORDER — VERAPAMIL HYDROCHLORIDE 120 MG/1
TABLET, FILM COATED ORAL
Refills: 5 | COMMUNITY
Start: 2018-05-01 | End: 2018-05-15

## 2018-05-15 RX ORDER — PREDNISONE 10 MG/1
TABLET ORAL
Refills: 4 | COMMUNITY
Start: 2018-05-09 | End: 2018-05-15

## 2018-05-15 RX ORDER — FLUTICASONE PROPIONATE 50 MCG
SPRAY, SUSPENSION (ML) NASAL
Qty: 1 BOTTLE | Refills: 3 | Status: SHIPPED | OUTPATIENT
Start: 2018-05-15

## 2018-05-15 RX ORDER — FLUTICASONE PROPIONATE 50 MCG
2 SPRAY, SUSPENSION (ML) NASAL EVERY EVENING
Qty: 1 BOTTLE | Refills: 3 | Status: SHIPPED | OUTPATIENT
Start: 2018-05-15 | End: 2018-05-15 | Stop reason: SDUPTHER

## 2018-05-15 RX ORDER — MONTELUKAST SODIUM 10 MG/1
TABLET ORAL
Qty: 90 TABLET | Refills: 5 | Status: SHIPPED | OUTPATIENT
Start: 2018-05-15

## 2018-05-15 RX ORDER — PREDNISONE 10 MG/1
TABLET ORAL
Refills: 4 | COMMUNITY
Start: 2018-05-15

## 2018-05-16 DIAGNOSIS — M19.90 ARTHRITIS: ICD-10-CM

## 2018-05-16 DIAGNOSIS — J44.9 CHRONIC OBSTRUCTIVE PULMONARY DISEASE, UNSPECIFIED COPD TYPE (HCC): Primary | ICD-10-CM

## 2018-05-16 DIAGNOSIS — M54.5 CHRONIC LOW BACK PAIN, UNSPECIFIED BACK PAIN LATERALITY, WITH SCIATICA PRESENCE UNSPECIFIED: ICD-10-CM

## 2018-05-16 DIAGNOSIS — Z99.81 ON HOME OXYGEN THERAPY: ICD-10-CM

## 2018-05-16 DIAGNOSIS — M16.10 PRIMARY OSTEOARTHRITIS OF HIP, UNSPECIFIED LATERALITY: ICD-10-CM

## 2018-05-16 DIAGNOSIS — G89.29 CHRONIC LOW BACK PAIN, UNSPECIFIED BACK PAIN LATERALITY, WITH SCIATICA PRESENCE UNSPECIFIED: ICD-10-CM

## 2018-05-16 DIAGNOSIS — R26.0 ATAXIC GAIT: ICD-10-CM

## 2018-05-16 DIAGNOSIS — R29.898 LEG WEAKNESS, BILATERAL: ICD-10-CM

## 2018-05-17 ENCOUNTER — TELEPHONE (OUTPATIENT)
Dept: FAMILY MEDICINE CLINIC | Age: 83
End: 2018-05-17

## 2024-11-16 NOTE — CARE COORDINATION
Hyponatremia is likely due to renal insufficiency. The patient's most recent sodium results are listed below.  Recent Labs     11/15/24  1155   *     Plan  - Correct the sodium by 4-6mEq in 24 hours.   - Obtain the following studies: none.  - Will treat the hyponatremia with Hemodialysis  - Monitor sodium Daily.   - Patient hyponatremia is stable   LSW set discharge to Specialty - LTAC at 4:30 pm by cot (pt on O2). Pt, RN and Maggie Swanson notified of discharge time. LSW left a message for the pt's .  Electronically signed by ALAN Pink on 3/21/18 at 1:02 PM